# Patient Record
Sex: FEMALE | Race: WHITE
[De-identification: names, ages, dates, MRNs, and addresses within clinical notes are randomized per-mention and may not be internally consistent; named-entity substitution may affect disease eponyms.]

---

## 2017-04-12 ENCOUNTER — HOSPITAL ENCOUNTER (OUTPATIENT)
Dept: HOSPITAL 53 - M ADAMS | Age: 57
End: 2017-04-12
Attending: PHYSICIAN ASSISTANT
Payer: COMMERCIAL

## 2017-04-12 DIAGNOSIS — J20.9: Primary | ICD-10-CM

## 2017-04-12 NOTE — REP
PA LATERAL CHEST:  04/12/2017.

 

CLINICAL HISTORY:  Acute bronchitis symptoms.  Nonproductive cough.  Chest

discomfort.

 

No prior study.

 

FINDINGS:  Lungs are well inflated.  There is no pleural effusion or dense

consolidation.  There is peribronchial thickening bilaterally which might reflect

reactive airway disease or bronchitis.  Some streaky or patchy density adjacent

left heart border.  This may be epicardial fat pad, some atelectasis or even

patchy infiltrate.  No effusion or dense consolidation.  Retrocardiac zone shows

some patchy atelectasis or infiltrate on the left base.  No definite effusion or

pleural thickening or apical scarring.  Heart, mediastinal and hilar contours

normal.  Airway intact.  Calcified aortic arch. No compression deformity or

destructive lesion in the spine.  The other visible bones also intact.

 

IMPRESSION: 1. Peribronchial thickening suggesting bronchiolitis or reactive

airway disease with retrocardiac left lower lobe and left base adjacent to the

heart border with patchy atelectasis or infiltrates.  It could be an epicardial

fat pad contributing to the left heart border appearance.  No effusion.

 

2.  No cardiomegaly or edema.

 

3.  Aorta with calcified arch but no aneurysm, it is intact.

 

 

 

 

Signed by

Houston Ferguson MD 04/12/2017 03:31 P

## 2017-04-25 ENCOUNTER — HOSPITAL ENCOUNTER (OUTPATIENT)
Dept: HOSPITAL 53 - M RAD | Age: 57
End: 2017-04-25
Attending: PAIN MEDICINE
Payer: COMMERCIAL

## 2017-04-25 DIAGNOSIS — M54.2: Primary | ICD-10-CM

## 2017-04-25 NOTE — REP
MR CERVICAL SPINE WITHOUT CONTRAST:

 

HISTORY:  Cervicalgia.

 

Facet hypertrophy is present on the left at the C3-4 level. This produces mild

narrowing of the left C3 neural foramen. The right C3 neural foramen in patent.

Facet hypertrophy is present on the left at the C4-5 level. This produces minimal

narrowing of the left C4 neural foramen. The right C4 neural foramen in patent. A

disc bulge with associated osteophyte formation is present at the C5-6 level.

There is mild effacement of the thecal sac without spinal cord compression.

Bilateral uncinate process hypertrophy is present. This produces moderate

narrowing of the C5 neural foramina.  There is no other disc bulge or herniation.

The remaining neural foramina are patent.  The spinal cord is normal in signal

intensity. The C5-6 intervertebral disc is decreased in height consistent with

disc degeneration.  Normal signal intensity is present in the cervical vertebral

bodies.

 

IMPRESSION:

 

There is cervical spondylosis at the C3-4 through C5-6 levels without spinal cord

compression.

 

 

Signed by

Yusuf Yadav MD 04/25/2017 09:23 A

## 2017-06-08 ENCOUNTER — HOSPITAL ENCOUNTER (OUTPATIENT)
Dept: HOSPITAL 53 - M LABNEURO | Age: 57
End: 2017-06-08
Attending: PSYCHIATRY & NEUROLOGY
Payer: COMMERCIAL

## 2017-06-08 DIAGNOSIS — E11.21: Primary | ICD-10-CM

## 2017-06-08 LAB
EST. AVERAGE GLUCOSE BLD GHB EST-MCNC: 108 MG/DL (ref 60–110)
FOLATE SERPL-MCNC: 8.1 NG/ML (ref 5.4–?)
PROT SERPL-MCNC: 6.6 GM/DL (ref 6.4–8.2)
VIT B12 SERPL-MCNC: 380 PG/ML (ref 247–911)

## 2017-06-09 LAB
ALBUMIN MFR UR ELPH: 61.4 % (ref 55.8–66.1)
ALBUMIN SERPL-MCNC: 4.05 GM/DL (ref 3.29–5.55)
GAMMA GLOB 24H MFR UR ELPH: 10.1 % (ref 11.1–18.8)

## 2017-06-12 LAB — A-TOCOPHEROL VIT E SERPL-MCNC: 11.2 MG/L (ref 5.3–16.8)

## 2017-10-03 ENCOUNTER — HOSPITAL ENCOUNTER (OUTPATIENT)
Dept: HOSPITAL 53 - M LAB REF | Age: 57
End: 2017-10-03
Attending: OPHTHALMOLOGY
Payer: COMMERCIAL

## 2017-10-03 DIAGNOSIS — L72.0: Primary | ICD-10-CM

## 2017-10-11 ENCOUNTER — HOSPITAL ENCOUNTER (OUTPATIENT)
Dept: HOSPITAL 53 - M RAD | Age: 57
End: 2017-10-11
Attending: OTOLARYNGOLOGY
Payer: COMMERCIAL

## 2017-10-11 DIAGNOSIS — R49.0: Primary | ICD-10-CM

## 2017-10-11 PROCEDURE — 74220 X-RAY XM ESOPHAGUS 1CNTRST: CPT

## 2017-10-11 PROCEDURE — 70491 CT SOFT TISSUE NECK W/DYE: CPT

## 2017-10-11 NOTE — REP
Esophagram

 

The procedure was performed under the direct supervision of Dr. Torres.  The images

were reviewed with Dr. Torres.

 

A single view PA chest x-ray is submitted as a  film.  The superior

mediastinal structures are midline.  The heart size is within normal limits.  The

lungs are clear.

 

Liquid barium and gas producing granules were given in the erect position as well

as liquid barium in the prone oblique positions in order to perform a double

contrast esophagram examination.

 

The oral and pharyngeal stages of deglutition are unremarkable. Note is made of

bilateral carotid vascular calcifications. Esophageal transport is prompt and

efficient and there is no esophagitis, stricture, mucosal ring or hiatal hernia.

Gastroesophageal reflux is not demonstrated on this examination.

 

Impression:

Essentially unremarkable double contrast esophagram examination.  Note is made of

bilateral carotid vascular calcifications.

 

1 minute and 9 seconds of fluoro time was utilized for this procedure.

 

 

Reviewed by

SANDRA Gonsalez 10/11/2017 04:11 PSigned by

Rowdy Torres MD 10/11/2017 04:59 P

## 2017-10-11 NOTE — REP
CT NECK WITH CONTRAST:

 

HISTORY:  Dysphagia.

 

CONTRAST:  Isovue 370, 75 mL.

 

Calcifications are present in the tonsils.  This is secondary to previous

inflammatory disease.  The naso-, kely- and hypopharynx, larynx and subglottic

trachea are otherwise normal in appearance.  The salivary and thyroid glands are

normal in size and density.  Small lymph nodes less than 1 cm in size are present

in the internal jugular chains, posterior triangles and submandibular area.

Atherosclerotic calcification is present at the bifurcations. Degenerative change

is present in the cervical spine.  The lung apices are clear.  The visualized

sinuses are clear.

 

IMPRESSION:

 

There is no neck mass or adenopathy.

 

 

Signed by

Yusuf Yadav MD 10/11/2017 08:58 A

## 2018-02-16 ENCOUNTER — HOSPITAL ENCOUNTER (OUTPATIENT)
Dept: HOSPITAL 53 - M SFHCADAM | Age: 58
End: 2018-02-16
Attending: PHYSICIAN ASSISTANT
Payer: COMMERCIAL

## 2018-02-16 DIAGNOSIS — Z20.6: Primary | ICD-10-CM

## 2018-02-16 LAB — HIV 1&2 SCREEN CENTAUR: NEGATIVE

## 2018-03-13 ENCOUNTER — HOSPITAL ENCOUNTER (OUTPATIENT)
Dept: HOSPITAL 53 - M SFHCADAM | Age: 58
End: 2018-03-13
Attending: PHYSICIAN ASSISTANT
Payer: COMMERCIAL

## 2018-03-13 DIAGNOSIS — G44.211: Primary | ICD-10-CM

## 2018-03-13 DIAGNOSIS — I10: ICD-10-CM

## 2018-03-13 LAB
ALBUMIN/GLOBULIN RATIO: 1.19 (ref 1–1.93)
ALBUMIN: 3.8 GM/DL (ref 3.2–5.2)
ALKALINE PHOSPHATASE: 120 U/L (ref 45–117)
ALT SERPL W P-5'-P-CCNC: 29 U/L (ref 12–78)
ANION GAP: 8 MEQ/L (ref 8–16)
AST SERPL-CCNC: 19 U/L (ref 7–37)
BILIRUBIN,TOTAL: 0.5 MG/DL (ref 0.2–1)
BLOOD UREA NITROGEN: 10 MG/DL (ref 7–18)
CALCIUM LEVEL: 9.5 MG/DL (ref 8.5–10.1)
CARBON DIOXIDE LEVEL: 27 MEQ/L (ref 21–32)
CHLORIDE LEVEL: 106 MEQ/L (ref 98–107)
CREATININE FOR GFR: 0.56 MG/DL (ref 0.55–1.3)
ERYTHROCYTE SEDIMENTATION RATE: 5 MM/HR (ref 0–30)
GFR SERPL CREATININE-BSD FRML MDRD: > 60 ML/MIN/{1.73_M2} (ref 51–?)
GLUCOSE, FASTING: 88 MG/DL (ref 70–100)
HEMATOCRIT: 47.6 % (ref 36–47)
HEMOGLOBIN: 15.7 G/DL (ref 12–16)
MEAN CORPUSCULAR HEMOGLOBIN: 31.3 PG (ref 27–33)
MEAN CORPUSCULAR HGB CONC: 33 G/DL (ref 32–36.5)
MEAN CORPUSCULAR VOLUME: 94.8 FL (ref 80–96)
NRBC BLD AUTO-RTO: 0 % (ref 0–0)
PLATELET COUNT, AUTOMATED: 247 10^3/UL (ref 150–450)
POTASSIUM SERUM: 4.4 MEQ/L (ref 3.5–5.1)
RED BLOOD COUNT: 5.02 10^6/UL (ref 4–5.4)
RED CELL DISTRIBUTION WIDTH: 12.8 % (ref 11.5–14.5)
SODIUM LEVEL: 141 MEQ/L (ref 136–145)
TOTAL PROTEIN: 7 GM/DL (ref 6.4–8.2)
WHITE BLOOD COUNT: 9.7 10^3/UL (ref 4–10)

## 2018-03-13 PROCEDURE — 80053 COMPREHEN METABOLIC PANEL: CPT

## 2018-06-11 ENCOUNTER — HOSPITAL ENCOUNTER (OUTPATIENT)
Dept: HOSPITAL 53 - M PLARAD | Age: 58
End: 2018-06-11
Payer: COMMERCIAL

## 2018-06-11 DIAGNOSIS — M54.2: Primary | ICD-10-CM

## 2018-06-11 PROCEDURE — 72141 MRI NECK SPINE W/O DYE: CPT

## 2018-08-07 ENCOUNTER — HOSPITAL ENCOUNTER (EMERGENCY)
Dept: HOSPITAL 53 - M ED | Age: 58
Discharge: HOME | End: 2018-08-07
Payer: COMMERCIAL

## 2018-08-07 DIAGNOSIS — I45.10: ICD-10-CM

## 2018-08-07 DIAGNOSIS — Z88.2: ICD-10-CM

## 2018-08-07 DIAGNOSIS — F17.210: ICD-10-CM

## 2018-08-07 DIAGNOSIS — Z88.0: ICD-10-CM

## 2018-08-07 DIAGNOSIS — R07.89: Primary | ICD-10-CM

## 2018-08-07 DIAGNOSIS — I10: ICD-10-CM

## 2018-08-07 DIAGNOSIS — Z79.899: ICD-10-CM

## 2018-08-07 DIAGNOSIS — Z82.49: ICD-10-CM

## 2018-08-07 DIAGNOSIS — M54.6: ICD-10-CM

## 2018-08-07 LAB
ANION GAP: 6 MEQ/L (ref 8–16)
BASO #: 0.1 10^3/UL (ref 0–0.2)
BASO %: 0.8 % (ref 0–1)
BLOOD UREA NITROGEN: 9 MG/DL (ref 7–18)
CALCIUM LEVEL: 8.6 MG/DL (ref 8.5–10.1)
CARBON DIOXIDE LEVEL: 28 MEQ/L (ref 21–32)
CHLORIDE LEVEL: 109 MEQ/L (ref 98–107)
CK MB CFR.DF SERPL CALC: 2.02
CK MB CFR.DF SERPL CALC: 2.72
CK SERPL-CCNC: 77 U/L (ref 26–192)
CK SERPL-CCNC: 94 U/L (ref 26–192)
CK-MB VALUE MASS: 1.9 NG/ML (ref ?–3.6)
CK-MB VALUE MASS: 2.1 NG/ML (ref ?–3.6)
CREATININE FOR GFR: 0.59 MG/DL (ref 0.55–1.3)
D-DIMER QUANT: 530.9 NG/ML (ref ?–500)
EOS #: 0.2 10^3/UL (ref 0–0.5)
EOSINOPHIL NFR BLD AUTO: 2 % (ref 0–3)
GFR SERPL CREATININE-BSD FRML MDRD: > 60 ML/MIN/{1.73_M2} (ref 51–?)
GLUCOSE, FASTING: 91 MG/DL (ref 70–100)
HEMATOCRIT: 46 % (ref 36–47)
HEMOGLOBIN: 15.3 G/DL (ref 12–15.5)
IMMATURE GRANULOCYTE %: 0.3 % (ref 0–3)
LYMPH #: 2.3 10^3/UL (ref 1.5–4.5)
LYMPH %: 29.8 % (ref 24–44)
MEAN CORPUSCULAR HEMOGLOBIN: 32.6 PG (ref 27–33)
MEAN CORPUSCULAR HGB CONC: 33.3 G/DL (ref 32–36.5)
MEAN CORPUSCULAR VOLUME: 97.9 FL (ref 80–96)
MONO #: 0.5 10^3/UL (ref 0–0.8)
MONO %: 6.5 % (ref 0–5)
NEUTROPHILS #: 4.6 10^3/UL (ref 1.8–7.7)
NEUTROPHILS %: 60.6 % (ref 36–66)
NRBC BLD AUTO-RTO: 0 % (ref 0–0)
PLATELET COUNT, AUTOMATED: 162 10^3/UL (ref 150–450)
POTASSIUM SERUM: 3.7 MEQ/L (ref 3.5–5.1)
RED BLOOD COUNT: 4.7 10^6/UL (ref 4–5.4)
RED CELL DISTRIBUTION WIDTH: 12.6 % (ref 11.5–14.5)
SODIUM LEVEL: 143 MEQ/L (ref 136–145)
TROPONIN I: < 0.02 NG/ML (ref ?–0.1)
TROPONIN I: < 0.02 NG/ML (ref ?–0.1)
WHITE BLOOD COUNT: 7.6 10^3/UL (ref 4–10)

## 2018-08-07 RX ADMIN — ONDANSETRON 1 MG: 2 INJECTION INTRAMUSCULAR; INTRAVENOUS at 08:45

## 2018-08-07 RX ADMIN — MORPHINE SULFATE 1 MG: 4 INJECTION INTRAVENOUS at 08:45

## 2018-08-07 RX ADMIN — ONDANSETRON 1 MG: 2 INJECTION INTRAMUSCULAR; INTRAVENOUS at 08:49

## 2018-08-07 RX ADMIN — KETOROLAC TROMETHAMINE 1 MG: 30 INJECTION, SOLUTION INTRAMUSCULAR at 09:11

## 2018-08-07 RX ADMIN — MORPHINE SULFATE 1 MG: 4 INJECTION INTRAVENOUS at 08:49

## 2019-02-15 ENCOUNTER — HOSPITAL ENCOUNTER (OUTPATIENT)
Dept: HOSPITAL 53 - M PAIN | Age: 59
End: 2019-02-15
Attending: ANESTHESIOLOGY
Payer: COMMERCIAL

## 2019-02-15 DIAGNOSIS — M47.816: ICD-10-CM

## 2019-02-15 DIAGNOSIS — M48.061: ICD-10-CM

## 2019-02-15 DIAGNOSIS — Z88.1: ICD-10-CM

## 2019-02-15 DIAGNOSIS — I10: ICD-10-CM

## 2019-02-15 DIAGNOSIS — M47.812: Primary | ICD-10-CM

## 2019-02-15 DIAGNOSIS — R49.0: ICD-10-CM

## 2019-02-15 DIAGNOSIS — G62.9: ICD-10-CM

## 2019-02-15 DIAGNOSIS — I73.00: ICD-10-CM

## 2019-02-15 DIAGNOSIS — Z79.891: ICD-10-CM

## 2019-02-15 DIAGNOSIS — Z79.899: ICD-10-CM

## 2019-02-15 DIAGNOSIS — Z90.49: ICD-10-CM

## 2019-02-15 DIAGNOSIS — Z88.2: ICD-10-CM

## 2019-02-15 DIAGNOSIS — M51.16: ICD-10-CM

## 2019-02-15 DIAGNOSIS — F17.210: ICD-10-CM

## 2019-02-15 DIAGNOSIS — Z88.0: ICD-10-CM

## 2019-02-21 ENCOUNTER — HOSPITAL ENCOUNTER (OUTPATIENT)
Dept: HOSPITAL 53 - M RAD | Age: 59
End: 2019-02-21
Attending: PHYSICIAN ASSISTANT
Payer: COMMERCIAL

## 2019-02-21 DIAGNOSIS — R42: Primary | ICD-10-CM

## 2019-02-21 DIAGNOSIS — R55: ICD-10-CM

## 2019-02-21 PROCEDURE — 93880 EXTRACRANIAL BILAT STUDY: CPT

## 2019-02-21 PROCEDURE — 70553 MRI BRAIN STEM W/O & W/DYE: CPT

## 2019-02-21 NOTE — REP
MRI BRAIN WITHOUT CONTRAST: HISTORY:  Dizziness.

 

CONTRAST:  ProHance 15 mL.

 

Several punctate areas of increased signal intensity on T2-weighted images are

present in the isela.  This represents small vessel ischemic disease.  There is no

intraparenchymal hemorrhage, infarct, mass or midline shift.  There is no

abnormal enhancement.  The ventricular system is normal in appearance.  There is

no extracerebral collection.  There is inferior extension of the cerebellar

tonsils    through     the foramen magnum consistent with cerebellar tonsillar

ectopia.  There is no cerebellopontine angle mass.  The inner ear structures are

normal in appearance.  Minimal mucosal thickening is present in the mastoid air

cells.  The sinuses are clear.

 

IMPRESSION:

 

1.  Minimal small vessel ischemic disease.

 

2.  Cerebellar tonsillar ectopia.

 

 

Electronically Signed by

Yusuf Yadav MD 02/21/2019 11:13 A

## 2019-02-22 NOTE — REP
Clinical:  Headaches and dizziness with presyncopal episode.

 

Technique:  Gray scale and color Doppler evaluation using linear high frequency

transducer

 

Findings:

Two-dimensional gray scale and color images demonstrate mixed atheromatous

plaquing (left greater than right).  Color Doppler interrogation demonstrates

normal arterial wave patterns and velocities with no significant spectral

broadening.  Normal flow direction is appreciated in the bilateral vertebral

arteries.

 

                                              RIGHT (cm/s)         LEFT (cm/s)

 

ICA peak systolic velocity            65.0                          84.4

ICA diastolic velocity                  31.7                          25.2

ECA peak systolic velocity           88.7                          103.0

CCA peak systolic velocity           65.2                          72.4

ICA/CCA ratio                           1.0                          1.2

 

Impression:

No hemodynamically significant areas of narrowing or stenosis appreciated.

Based on set standards narrowing falls within the less than 50% range.

 

 

Electronically Signed by

Marcos Stroud MD 02/22/2019 09:56 A

## 2019-03-04 NOTE — ECWPNPC
PATIENT NAME: PRECIOUS CONNORS

: 1960

GENDER: FEMALE

MRN: [id number]

VISIT DATE: 02/15/2019

DISCHARGE DATE: 02/15/19 1224

VISIT LOCKED DATE TIME: 

PHYSICIAN: DANIELA HUGGINS MD

RESOURCE: DANIELA HUGGINS MD

 

           

           

REASON FOR APPOINTMENT

           

          1. NECK AND BACK PAIN W/ POLYNEUROPATHY

           

HISTORY OF PRESENT ILLNESS

           

      NEW PATIENT CONSULT:

      WHEN DID YOUR PAIN FIRST START?

          _____.

           

      BRIEFLY DESCRIBE HOW YOUR PAIN STARTED?

          ______.

           

      HOW DOES YOUR PAIN CHANGE WITH TIME?

          _______.

           

      DOES YOUR PAIN AWAKEN YOU FROM SLEEP?

          _____.

           

      HOW MANY HOURS OF SLEEP DO YOU NORMALLY GET?

          ______.

           

      ANY DIAGNOSTIC TESTING?

          _____.

           

      FACILITY WHERE TESTS WERE DONE?

          ____.

           

      PAIN TREATMENT

           

           

          TREATMENT YES

           

      CANCER

           

           

          HAVE YOU EVER HAD ANY TYPE OF CANCER?NO

           

           

          NO.

           

      PAIN SCREENING:

      PATIENT HAS A COMPLAINT OF ACUTE OR CHRONIC PAIN

           

           

          :YES

           

           58 YEAR OLD FEMALE PATIENT WITH A HISTORY OF CHRONIC LOW BACK

          AND NECK PAIN. THE PATIENT DESCRIBES THE PAIN AS ACHING, BURNING,

          SHARP, AND CONTINUOUS WITH A PAIN SCORE OF 4-8/10 DEPENDING ON

          PHYSICAL ACTIVITY. THE PATIENT SAYS HER PAIN IS IN HER NECK AREA

          AND LOW BACK AREA WITH RADIATION DOWN HER RIGHT LEG. THE PATIENT

          SAYS THAT SHE HAS HAD THIS PAIN FOR A FEW YEARS AND HAS RECEIVED

          INJECTIONS IN THE PAST AT [facility] AND [facility]. THE

          PATIENT SAYS SHE HAS HAD GOOD PAIN RELIEF WITH INJECTIONS IN THE

          PAST. THE PATIENT REPORTS THAT SHE HAS A HISTORY OF VERTIGO AND

          SHE IS UNABLE TO MOVE HER NECK CERTAIN WAYS DUE TO THIS, BUT IS

          BEING TREATED FOR IT. PATIENT DENIES UNEXPLAINABLE WEIGHT LOSS,

          FEVER, CHILLS, NEW CHANGES ON HER URINARY OR BOWEL CONTROL.

           

      FALL RISK SCREENING:

      SCREENING

           

          : NO FALLS IN THE PAST YEAR.

           

      BECK INVENTORY:

      QUESTIONNAIRE

           

           

          ASSESSEDTBD

           

      SCORE

           

           

          VALUE CALCULATED TBD

           

CURRENT MEDICATIONS

           

          TAKING OXYCODONE-ACETAMINOPHEN  MG TABLET 1 TABLET AS

          NEEDED ORALLY

          TAKING GABAPENTIN 600 MG TABLET 1 CAP AM 1/2 NOON 1 CAP PM ORALLY

          THREE TIMES DAILY

          TAKING MULTIVITAMIN - TABLET CHEWABLE 1 TAB ORALLY DAILY

          TAKING VITAMIN B12 1000 MCG TABLET EXTENDED RELEASE 1 TABLET

          ORALLY ONCE A DAY

          TAKING AMLODIPINE BESYLATE 10 MG TABLET 1 TABLET ORALLY ONCE A

          DAY IN THE WINTER DUE TO FROST BITE/FEET

          TAKING MECLIZINE HCL 25 MG TABLET 1 TABLET AS NEEDED ORALLY

          BEFORE BEDTIME

          TAKING CLARITIN 10 MG TABLET 1 TABLET ORALLY ONCE A DAY

          TAKING CHANTIX STARTING MONTH JERSEY 0.5 MG X 11 & 1 MG X 42 TABLET

          AS DIRECTED ORALLY AS DIRECTED, NOTES: NOT STARTED YET

          TAKING CHANTIX CONTINUING MONTH JERSEY 1 MG TABLET 1 TABLET ORALLY

          TWICE A DAY, NOTES: NOT STARTED YET

          NOT-TAKING FLUTICASONE PROPIONATE 50 MCG/ACT SUSPENSION SPRAY 2

          SPRAYS IN EACH NOSTRIL ONCE DAILY NASAL

          NOT-TAKING OXYCONTIN 10 MG TABLET EXTENDED RELEASE 12 HOUR 1

          TABLET ORALLY DAILY PRN

          MEDICATION LIST REVIEWED AND RECONCILED WITH THE PATIENT

           

PAST MEDICAL HISTORY

           

          ABNORMAL PAP SMEAR, HPV +PRIOR TO 

          RAYNAUD'S

          FROST BITE- FEET 

          NEUROPATHY ARMS AND LEGS - EMG 16 - SENSORY PERIPHERAL

          POLYNEUROPATHY - FOLLOWED BY DR. LOPEZ

          CHRONIC LOW BACK PAIN 2017 - MRI C-SPINE C3-4 - C5-6

          SPONDYLOSIS WITHOUT SPINAL CORD COMPRESSION

          MRI LUMBARDIFFUSE DISC BULGS, MILD CENTRAL CANAL STENOSIS - GETS

          NERVE BLOCKS AT SPINE AND WELLNESS

          BL CARPEL TUNNEL

          HTN

          NICOTINE DEPENDENCE

          CHRONIC HOARSE VOICE -UNDERWENT SCOPE BY ENT - TREATED BY ENT

          WITH PPI X 1 MONTH.

           

ALLERGIES

           

          PENICILLIN (FOR ALLERGIES USE ONLY): ANAPHYLAXIS: ALLERGY

          SULFA (FOR ALLERGY USE ONLY): ANAPHYLAXIS: ALLERGY

          DOXYCYCLINE: NAUSEA, DIARRHEA: SIDE EFFECTS

           

SURGICAL HISTORY

           

          CHOLECYSTECTOMY 

          COLPOSCOPY [id number]

          COLONOSCOPY (FOR DIARRHEA) WITH NORMAL FINDINGS 

          NUMEROUS INJECTIONS INCLUDING RADIOFREQUENCY 2016 UNTIL NOW

           

FAMILY HISTORY

           

          FATHER:  71 YRS, MVA

          MOTHER: ALIVE, ATRIAL FIB

          SIBLINGS: SISTER BREAST CANCER AT AGE EARLY 40'S

          [facility]

           

SOCIAL HISTORY

           

          GENERAL:

           

          TOBACCO USE

          ARE YOU A:CURRENT SMOKER

          ARE YOU INTERESTED IN QUITTING?THINKING ABOUT QUITTING

          COUNSELED THE PATIENT ON SMOKING CESSATION, EDUCATION

          [id number]/15/2019

          HOW MANY CIGARETTES A DAY DO YOU SMOKE?11-20

          HOW SOON AFTER YOU WAKE UP DO YOU SMOKE YOUR FIRST

          CIGARETTE?WITHIN 5 MIN

          HOW OFTEN DO YOU SMOKE CIGARETTES?EVERY DAY

          PATIENT COUNSELED ON THE DANGERS OF TOBACCO USE AND URGED TO

          QUIT:02/15/2019

           

           

          ALCOHOL SCREENING

          DID YOU HAVE A DRINK CONTAINING ALCOHOL IN THE PAST YEAR?NO

          POINTS0

          INTERPRETATIONNEGATIVE

           

           

          RECREATIONAL DRUG USE  SMOKING STATUS: CURRENT SMOKER, IS THE

          PATIENT BETWEEN THE AGE OF 57 AND 79? YES, HAS THE PATIENT EVER

          BEEN DIAGNOSED WITH LUNG CANCER? NO.

           

           

          CAFFEINE

          CAFFEINE USE?YES DAILY SEVERAL CUPS

           

           

          Mu-ism

          [id number]

           

           

          LANGUAGE

          LANGUAGES SPOKEN:ENGLISH

           

           

          EDUCATION

          LEVEL OF EDUCATION:HIGH SCHOOL

           

           

          OCCUPATION: DO YOU FEEL SAFE IN YOUR ENVIRONMENT? YES.

           

           

          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

          [facility] [employer].

           

           

          EXERCISE: DO YOU FEEL SAFE IN YOUR ENVIRONMENT? YES [employer]

          [facility], REGULAR.

           

           

          MARITAL STATUS: DO YOU FEEL SAFE IN YOUR ENVIRONMENT? YES [employer]

          [employer], REGULAR, WALKS KEEP WORKING.

           

           

          OTHERS AT HOME: DO YOU FEEL SAFE IN YOUR ENVIRONMENT? YES [employer]

          [employer] [employer], REGULAR, WALKS KEEP WORKING, SINGLE.

           

           

          HOUSING: PFS REFERRAL NEEDED? NO, CLERGY REFERRAL NEEDED? NO,

          PUBLIC HEALTH REFERRAL NEEDED? NO, WAS THE PROVIDER NOTIFIED OF

          ANY PERTINENT INFO? NO, HAS THE PATIENT BEEN EDUCATED REGARDING

          HIS/HER PLAN OF CARE? YES, HAS THE PATIENT BEEN EDUCATED

          REGARDING PAIN, THE RISK FOR PAIN, THE IMPORTANCE OF EFFECTIVE

          PAIN MANAGEMENT, AND THE PAIN ASSESSMENT PROCESS? YES.

           

           

          ADVANCE DIRECTIVE

          ADVANCE DIRECTIVE DISCUSSED WITH PATIENT:YES PATIENT GIVEN

          PRINTED MATERIAL TO TAKE HOME TO FILL OUT WITH FAMILY MEMBERS,

          DECLINES MY ASSISTANCE WITH IT AT THIS TIME

           

HOSPITALIZATION/MAJOR DIAGNOSTIC PROCEDURE

           

          BROKEN ARM-CHILD

          SURGERY

           

REVIEW OF SYSTEMS

           

      REVIEWED BY:

           

          PROVIDER:    DANIELA HUGGINS MD .

           

      CONSTITUTIONAL:

           

          ANY CHANGE IN YOUR MEDICAL CONDITION?    NO . CHILLS    NO .

          FEVER    NO .

           

      INFECTION:

           

          DO YOU HAVE NEW INFECTIONS?    NO . DO YOU HAVE HISTORY OF MRSA? 

            NO .

           

      MUSCULOSKELETAL:

           

          ANY NEW PATTERNS OF PAIN OR NUMBNESS?    INCREASED "EVERYWHERE" .

          SYTEMIC LUPUS    NO .

           

      GASTROENTEROLOGY:

           

          ANY NEW CHANGE IN BOWEL CONTROL?    NO . BARRETTS ESOPHAGUS    NO

          . CIRRHOSIS    NO . HEPATITIS    NO . LIVER FAILURE    NO . ACID

          REFLUX    NO . UNEXPLAINED WEIGHT LOSS    NO .

           

      GENITOURINARY:

           

          ANY NEW CHANGE IN BLADDER CONTROL?    NO . IS THERE A CHANCE YOU

          COULD BE PREGNANT?    NO .

           

      HEMATOLOGY/LYMPH:

           

          DO YOU TAKE ANY BLOOD THINNERS? (FOR EXAMPLE- COUMADIN, PLAVIX,

          AGGRENOX, PLATEL, PRADAXA, OR XARELTO)    NO . WHEN WAS YOUR LAST

          DOSE?    DATE: TIME:  . LOW PLATELET COUNT    NO . SICKLE CELL

          DISEASE    NO . VON WILLIEBRANDS    NO . FACTOR V LEIDEN    NO .

          THALLASEMIA    NO . ANEMIA    NO . EASY BRUISING    NO .

           

      NEUROLOGY:

           

          HAVE YOU FALLEN IN THE PAST 12 MONTHS?    NO . ANY NEW EXTREMITY

          NUMBNESS OR WEAKNESS?    NO . HEAD INJURY    NO . DEMENTIA    NO

          . CEREBRAL PALSY    NO . MULTIPLE SCLEROSIS    NO . DIZZINESS   

          YES . HEADACHE    NO . STROKES    NO . VERTIGO    YES FLUID

          BEHIND THE EAR RECENTLY IS TAKING MEDICATION TO RESOLVE THIS

          ISSUE  .

           

      CARDIOLOGY:

           

          DO YOU HAVE A PACEMAKER OR DEFIBRILLATOR?    NO . ANGINA    NO .

          HEART ATTACK    NO . HEART SURGERY    NO . CONGESTIVE HEART

          FAILURE/FLUID OVERLOAD    NO . CHEST PAIN    NO . HIGH BLOOD

          PRESSURE    NO . IRREGULAR HEART BEAT    NO .

           

      RESPIRATORY:

           

          HAVE YOU BEEN SICK IN THE PAST WEEK?    NO . FEVER    NO . FLU

          LIKE SYMPTOMS?    NO . CPAP    NO . BYPAP    NO . ASTHMA    NO .

          EMPHYSEMA    NO . CHRONIC LUNG DISEASES    NO . SHORTNESS OF

          BREATH ON EXERTION    NO . DO YOU USE ANY TYPE OF TOBACCO (SMOKE,

          SMOKELESS, CHEW)?    YES WILL BE STARTING CHANTIX XOON . COUGH   

          NO . SNORING    NO .

           

      INTEGUMENTARY:

           

          DO YOU HAVE ANY RASHES OR OPEN SORES?    NO .

           

      ALLERGIC/IMMUNO:

           

          ARE YOU ALLERGIC TO IV DYE?    NO . ANY NEW ALLERGIES?    NO .

           

      PSYCHIATRIC:

           

          DO YOU HAVE THOUGHTS OF HURTING YOURSELF OR SOMEONE ELSE?    NO .

          ARE YOU ABUSED, NEGLECTED, OR IN AN UNSAFE ENVIRONMENT?    NO .

           

      ENDOCRINOLOGY:

           

          ARE YOU DIABETIC?    NO . THYROID DISORDER    NO .

           

      OTHER:

           

          DO YOU NEED ANY PRESCRIPTIONS?    NO . IF YES, PLEASE LIST:   

          ____ . ANY NEW PROBLEMS WITH YOUR MEDICATIONS?    NO . WHEN DID

          YOU LAST EAT?    ____ . WHEN DID YOU LAST DRINK?    ____ . WHAT

          DID YOU LAST DRINK?    ____ . NAME OF PERSON DRIVING YOU HOME?   

          ____ . DO YOU HAVE ANY OTHER QUESTIONS OR CONCERNS    NO .

           

VITAL SIGNS

           

           LBS, HT 5'3 1/2", BMI 29.29 INDEX, /75 MM HG, HR 89

          /MIN, RR 18 /MIN, TEMP 97.6 F, OXYGEN SAT % 95%, SAFE IN ENV?

          (Y/N) YES, NA INITIALS SC 10:28, REVIEWED BY: KG.

           

EXAMINATION

           

      GENERAL EXAMINATION: PATIENT IS ALERT O X 3 AND

          COOPERATIVE. LUNGS CLEAR, TO AUSCULTATION. HEART: NO MURMURS OR

          GALLOPS; FACIAL CRANIAL NERVES ARE GROSSLY NORMAL. GOOD SYMMETRY

          OF FACIAL MUSCLE MOVEMENT. NORMAL VISUAL FIELDS. PATIENT HAS

          DIFFICULTY ABDUCTING THE UPPER EXTREMITIES. PAIN INCREASES OVER

          THE CERVICAL FACET JOINTS WITH EXTENSION AND LATERAL ROTATION OF

          THE NECK. PATIENT IS IN A FLEXED POSITION. TENDERNESS IN THE LOW

          BACK AREA. RIGHT LEG IS WEAKER AT EXTENSION AND FLEXION. STRAIGHT

          LEG RAISE OF THE RIGHT LEG IS POSITIVE AT 45 DEGREES FOR

          RADICULOPATHY. MRI OF THE CERVICAL SPINE DONE ON 2018 SHOWS

          BULGING DISCS AND FACET ARTHROPATHY CHANGES AT MULTIPLE LEVELS.

          MRI OF THE LUMBAR SPINE DONE ON 10/07/2016 SHOWS FACET

          ARTHROPATHY CHANGES, BULGING DISCS, AND STENOSIS AT MULTIPLE

          LEVELS.

           

ASSESSMENTS

           

          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

          RADICULOPATHY - M47.812 (PRIMARY)

           

          SPONDYLOSIS OF LUMBAR REGION WITHOUT MYELOPATHY OR RADICULOPATHY

          - M47.816

           

          SPINAL STENOSIS OF LUMBAR REGION, UNSPECIFIED WHETHER NEUROGENIC

          CLAUDICATION PRESENT - M48.061

           

          INTERVERTEBRAL DISC DISORDER WITH RADICULOPATHY OF LUMBAR REGION

          - M51.16

           

TREATMENT

           

      SPONDYLOSIS OF CERVICAL REGION WITHOUT MYELOPATHY

          OR RADICULOPATHY

          CLINICAL NOTES: WE DISCUSSED SEVERAL ISSUES WITH MRS. CONNORS'S

          PAIN MANAGEMENT CASE. I WOULD LIKE TO GET COPIES OF THE PATIENT'S

          MOST RECENT LUMBAR MRI REPORT. THE PATIENT WILL CONTINUE WITH HER

          LOW BACK INJECTIONS IN [place] FOR NOW SINCE THEY HAVE ALREADY

          REQUESTED AUTHORIZATION. THE PATIENT WILL CONSIDER INJECTIONS

          OVER THE NECK ONCE THE VERTIGO IS UNDER CONTROL. THE PATIENT WILL

          NEED IV SEDATION WITH ANY PROCEDURES DUE TO PAIN AND ANXIETY

          ASSOCIATED WITH THE PROCEDURES. THE PATIENT WILL FOLLOW UP IN 2

          MONTHS. INSTRUCTIONS WERE GIVEN, QUESTIONS WERE ANSWERED, PATIENT

          REPORTS UNDERSTANDING AND AGREES WITH THE PLAN. I, JOHN LARA, DOCUMENTED THE ABOVE INFORMATION ACTING AS A SCRIBE FOR

          DR. HUGGINS. I HAVE REVIEWED THE ABOVE DOCUMENT, WRITTEN BY

          JOHN CURRY AND I VERIFY THAT IT IS ACCURATE. DEAR DR. GRULLON:THANK YOU FOR YOUR KIND REFERRAL OF MRS. CONNORS. IF

          YOU WANT TO DISCUSS HER CASE WITH ME PLEASE CALL ME AT THE PAIN

          CENTER -[id number]. SINCERELY,DANIELA HUGGINS, [facility].

           

PROCEDURE CODES

           

           ESTABILISHED PATIENT [facility] FACILITY CHARGE

           

           CURRENT MEDS W/DOSAGES DOCUMENTED

           

           PAIN ASSESS POS TOOL F/U PLAN DOC

           

DISPOSITION & COMMUNICATION

           

FOLLOW UP

           

          2 MONTHS (REASON: NECK & LOW BACK)

           

 

ELECTRONICALLY SIGNED BY DANIELA HUGGINS MD, MD ON

          2019 AT 01:45 PM EST

           

           

           

 

DISCLAIMER :

THIS IS A VISIT SUMMARY EXTRACTED FROM THE [facility] CHART.

IT IS NOT A COPY OF THE [facility] PROGRESS NOTE.

MTDD

## 2020-01-28 ENCOUNTER — HOSPITAL ENCOUNTER (OUTPATIENT)
Dept: HOSPITAL 53 - M ADAMS | Age: 60
End: 2020-01-28
Attending: NURSE PRACTITIONER
Payer: COMMERCIAL

## 2020-01-28 ENCOUNTER — HOSPITAL ENCOUNTER (OUTPATIENT)
Dept: HOSPITAL 53 - M ADAMS | Age: 60
End: 2020-01-28
Attending: PHYSICIAN ASSISTANT
Payer: COMMERCIAL

## 2020-01-28 ENCOUNTER — HOSPITAL ENCOUNTER (OUTPATIENT)
Dept: HOSPITAL 53 - M SFHCADAM | Age: 60
End: 2020-01-28
Attending: PHYSICIAN ASSISTANT
Payer: COMMERCIAL

## 2020-01-28 DIAGNOSIS — R53.82: ICD-10-CM

## 2020-01-28 DIAGNOSIS — M19.012: ICD-10-CM

## 2020-01-28 DIAGNOSIS — M54.5: ICD-10-CM

## 2020-01-28 DIAGNOSIS — J44.9: ICD-10-CM

## 2020-01-28 DIAGNOSIS — F17.210: ICD-10-CM

## 2020-01-28 DIAGNOSIS — R06.09: Primary | ICD-10-CM

## 2020-01-28 DIAGNOSIS — Z13.220: ICD-10-CM

## 2020-01-28 DIAGNOSIS — I10: ICD-10-CM

## 2020-01-28 DIAGNOSIS — M19.011: Primary | ICD-10-CM

## 2020-01-28 LAB
25(OH)D3 SERPL-MCNC: 23.2 NG/ML (ref 30–100)
ALBUMIN SERPL BCG-MCNC: 3.8 GM/DL (ref 3.2–5.2)
ALT SERPL W P-5'-P-CCNC: 125 U/L (ref 12–78)
BILIRUB SERPL-MCNC: 0.5 MG/DL (ref 0.2–1)
BUN SERPL-MCNC: 9 MG/DL (ref 7–18)
CALCIUM SERPL-MCNC: 9.1 MG/DL (ref 8.5–10.1)
CHLORIDE SERPL-SCNC: 105 MEQ/L (ref 98–107)
CHOLEST SERPL-MCNC: 196 MG/DL (ref ?–200)
CHOLEST/HDLC SERPL: 3.38 {RATIO} (ref ?–5)
CO2 SERPL-SCNC: 29 MEQ/L (ref 21–32)
CREAT SERPL-MCNC: 0.54 MG/DL (ref 0.55–1.3)
FOLATE SERPL-MCNC: 19.4 NG/ML
GFR SERPL CREATININE-BSD FRML MDRD: > 60 ML/MIN/{1.73_M2} (ref 51–?)
GLUCOSE SERPL-MCNC: 84 MG/DL (ref 70–100)
HCT VFR BLD AUTO: 46.5 % (ref 36–47)
HDLC SERPL-MCNC: 58 MG/DL (ref 40–?)
HGB BLD-MCNC: 15.5 G/DL (ref 12–15.5)
LDLC SERPL CALC-MCNC: 114 MG/DL (ref ?–100)
MCH RBC QN AUTO: 32.6 PG (ref 27–33)
MCHC RBC AUTO-ENTMCNC: 33.3 G/DL (ref 32–36.5)
MCV RBC AUTO: 97.9 FL (ref 80–96)
NONHDLC SERPL-MCNC: 138 MG/DL
PLATELET # BLD AUTO: 210 10^3/UL (ref 150–450)
POTASSIUM SERPL-SCNC: 4.2 MEQ/L (ref 3.5–5.1)
PROT SERPL-MCNC: 6.8 GM/DL (ref 6.4–8.2)
RBC # BLD AUTO: 4.75 10^6/UL (ref 4–5.4)
SODIUM SERPL-SCNC: 138 MEQ/L (ref 136–145)
T4 FREE SERPL-MCNC: 1.29 NG/DL (ref 0.76–1.46)
TRIGL SERPL-MCNC: 121 MG/DL (ref ?–150)
TSH SERPL DL<=0.005 MIU/L-ACNC: 0.9 UIU/ML (ref 0.36–3.74)
VIT B12 SERPL-MCNC: 373 PG/ML
WBC # BLD AUTO: 8.2 10^3/UL (ref 4–10)

## 2020-01-29 NOTE — REP
Clinical:  Shoulder pain.

 

Technique:  Internal rotation, external rotation, and Y view of the right and

left shoulder.

 

Findings:

Right shoulder demonstrates cortical irregularity with subtle spurring and small

calcification at the acromioclavicular joint as well as subtle blunting to the

calcified glenoid rim.  Subacromial space is normal.  No acute fracture or

dislocation.

 

Left shoulder demonstrates minimal cortical irregularity at the acromioclavicular

joint and mild blunting to the calcified glenoid rim.  The subacromial space is

normal.  No periarticular calcifications or loose bodies noted.  No acute

fracture dislocation.

 

Impression:

Mild arthritic changes (right greater than left).

 

 

Electronically Signed by

Marcos Stroud MD 01/29/2020 03:38 A

## 2020-01-29 NOTE — REP
Clinical:  Dyspnea on exertion .

 

Comparison: 08/07/2018 .

 

Technique:  PA and lateral.

 

Findings:

The mediastinum and cardiac silhouette are normal.  The lung fields are clear and

without acute consolidation, effusion, or pneumothorax.  The skeletal structures

are intact and normal.

 

Impression:

1.   No acute cardiopulmonary process.

 

 

Electronically Signed by

Marcos Stroud MD 01/29/2020 03:34 A

## 2020-02-10 ENCOUNTER — HOSPITAL ENCOUNTER (OUTPATIENT)
Dept: HOSPITAL 53 - M RAD | Age: 60
End: 2020-02-10
Attending: PHYSICIAN ASSISTANT
Payer: COMMERCIAL

## 2020-02-10 DIAGNOSIS — R06.09: Primary | ICD-10-CM

## 2020-02-10 DIAGNOSIS — F17.210: ICD-10-CM

## 2020-02-10 NOTE — REP
Clinical:  Lung screening.  History smoking and dyspnea on exertion.

 

Comparison:  Contrast enhanced chest CT dated 08/07/2018

 

Technique:  Axial low-dose noncontrast images from the thoracic inlet to the

upper abdomen using lung screening technique.

 

Findings:

The lung fields are well-aerated. No consolidation, significant nodule or mass

lesion is appreciated.  No pleural effusion/reaction or pneumothorax.

Tracheobronchial tree is patent.  Mediastinum demonstrates mild atherosclerotic

changes of the coronary arteries without cardiomegaly.

 

Impression:

Lung-RADS category I.  No nodule or suspicious abnormality.

 

 

Electronically Signed by

Marcos Stroud MD 02/10/2020 10:28 A

## 2020-02-13 ENCOUNTER — HOSPITAL ENCOUNTER (OUTPATIENT)
Dept: HOSPITAL 53 - M SFHCADAM | Age: 60
End: 2020-02-13
Attending: PHYSICIAN ASSISTANT
Payer: COMMERCIAL

## 2020-02-13 DIAGNOSIS — R74.8: Primary | ICD-10-CM

## 2020-02-13 LAB — FERRITIN SERPL-MCNC: 90 NG/ML (ref 8–252)

## 2020-02-14 LAB
HBV CORE IGM SER QL: NEGATIVE
HBV SURFACE AB SER-ACNC: NEGATIVE M[IU]/ML
HCV AB SER QL: < 0 INDEX (ref ?–0.8)
HEPATITIS A ANTIBODY IGM: NEGATIVE

## 2020-02-16 LAB — MITOCHONDRIA M2 IGG SER-ACNC: <20 UNITS (ref 0–20)

## 2020-02-21 ENCOUNTER — HOSPITAL ENCOUNTER (OUTPATIENT)
Dept: HOSPITAL 53 - M RAD | Age: 60
End: 2020-02-21
Attending: PHYSICIAN ASSISTANT
Payer: COMMERCIAL

## 2020-02-21 DIAGNOSIS — Z90.49: ICD-10-CM

## 2020-02-21 DIAGNOSIS — R74.8: Primary | ICD-10-CM

## 2020-02-21 NOTE — REP
Right upper quadrant sonography:

 

History:  Elevated liver enzymes.  Prior cholecystectomy.  No comparison

sonography.

 

Findings:  Scanning through the right upper quadrant of the abdomen demonstrates

mildly increased echogenicity in the liver.  The liver is not enlarged and no

focal liver lesion is seen.  Limited views of the pancreas show no abnormality.

The common bile duct is normal post cholecystectomy measuring 10 mm in greatest

diameter.  No intrahepatic ductal dilation is seen.  There is no evidence of

ascites or right renal abnormality.  The right kidney measures 11.6 x 5.7 x 4.1

cm.

 

Impression:

 

No significant abnormality.  Post cholecystectomy.

 

 

Electronically Signed by

Rowdy Torres MD 02/21/2020 08:22 A

## 2020-05-26 ENCOUNTER — HOSPITAL ENCOUNTER (OUTPATIENT)
Dept: HOSPITAL 53 - M RAD | Age: 60
End: 2020-05-26
Attending: PHYSICIAN ASSISTANT
Payer: COMMERCIAL

## 2020-05-26 DIAGNOSIS — R60.0: Primary | ICD-10-CM

## 2020-05-26 LAB
ALBUMIN SERPL BCG-MCNC: 3.4 GM/DL (ref 3.2–5.2)
ALT SERPL W P-5'-P-CCNC: 22 U/L (ref 12–78)
BASOPHILS # BLD AUTO: 0.1 10^3/UL (ref 0–0.2)
BASOPHILS NFR BLD AUTO: 0.7 % (ref 0–1)
BILIRUB SERPL-MCNC: 0.7 MG/DL (ref 0.2–1)
BUN SERPL-MCNC: 10 MG/DL (ref 7–18)
CALCIUM SERPL-MCNC: 8.9 MG/DL (ref 8.5–10.1)
CHLORIDE SERPL-SCNC: 106 MEQ/L (ref 98–107)
CO2 SERPL-SCNC: 27 MEQ/L (ref 21–32)
CREAT SERPL-MCNC: 0.68 MG/DL (ref 0.55–1.3)
EOSINOPHIL # BLD AUTO: 0.2 10^3/UL (ref 0–0.5)
EOSINOPHIL NFR BLD AUTO: 2.8 % (ref 0–3)
GFR SERPL CREATININE-BSD FRML MDRD: > 60 ML/MIN/{1.73_M2} (ref 51–?)
GLUCOSE SERPL-MCNC: 79 MG/DL (ref 70–100)
HCT VFR BLD AUTO: 43.1 % (ref 36–47)
HGB BLD-MCNC: 14.1 G/DL (ref 12–15.5)
LYMPHOCYTES # BLD AUTO: 2.1 10^3/UL (ref 1.5–5)
LYMPHOCYTES NFR BLD AUTO: 24.8 % (ref 24–44)
MCH RBC QN AUTO: 31.9 PG (ref 27–33)
MCHC RBC AUTO-ENTMCNC: 32.7 G/DL (ref 32–36.5)
MCV RBC AUTO: 97.5 FL (ref 80–96)
MONOCYTES # BLD AUTO: 0.6 10^3/UL (ref 0–0.8)
MONOCYTES NFR BLD AUTO: 7.1 % (ref 0–5)
NEUTROPHILS # BLD AUTO: 5.5 10^3/UL (ref 1.5–8.5)
NEUTROPHILS NFR BLD AUTO: 64.3 % (ref 36–66)
PLATELET # BLD AUTO: 212 10^3/UL (ref 150–450)
POTASSIUM SERPL-SCNC: 3.9 MEQ/L (ref 3.5–5.1)
PROT SERPL-MCNC: 6.5 GM/DL (ref 6.4–8.2)
RBC # BLD AUTO: 4.42 10^6/UL (ref 4–5.4)
SODIUM SERPL-SCNC: 139 MEQ/L (ref 136–145)
WBC # BLD AUTO: 8.6 10^3/UL (ref 4–10)

## 2020-07-15 ENCOUNTER — HOSPITAL ENCOUNTER (EMERGENCY)
Dept: HOSPITAL 53 - M ED | Age: 60
Discharge: LEFT BEFORE DISCHARGE | End: 2020-07-15
Payer: COMMERCIAL

## 2020-07-15 VITALS
WEIGHT: 182.1 LBS | SYSTOLIC BLOOD PRESSURE: 114 MMHG | HEIGHT: 63 IN | DIASTOLIC BLOOD PRESSURE: 72 MMHG | BODY MASS INDEX: 32.27 KG/M2

## 2020-07-15 DIAGNOSIS — Z53.21: Primary | ICD-10-CM

## 2020-08-28 ENCOUNTER — HOSPITAL ENCOUNTER (OUTPATIENT)
Dept: HOSPITAL 53 - M SFHCADAM | Age: 60
End: 2020-08-28
Attending: FAMILY MEDICINE
Payer: COMMERCIAL

## 2020-08-28 DIAGNOSIS — G62.9: Primary | ICD-10-CM

## 2020-08-28 DIAGNOSIS — R60.0: ICD-10-CM

## 2020-08-28 LAB
BUN SERPL-MCNC: 5 MG/DL (ref 7–18)
CALCIUM SERPL-MCNC: 8.8 MG/DL (ref 8.5–10.1)
CHLORIDE SERPL-SCNC: 105 MEQ/L (ref 98–107)
CO2 SERPL-SCNC: 29 MEQ/L (ref 21–32)
CREAT SERPL-MCNC: 0.72 MG/DL (ref 0.55–1.3)
GFR SERPL CREATININE-BSD FRML MDRD: > 60 ML/MIN/{1.73_M2} (ref 51–?)
GLUCOSE SERPL-MCNC: 70 MG/DL (ref 70–100)
POTASSIUM SERPL-SCNC: 4 MEQ/L (ref 3.5–5.1)
SODIUM SERPL-SCNC: 141 MEQ/L (ref 136–145)

## 2020-09-01 ENCOUNTER — HOSPITAL ENCOUNTER (OUTPATIENT)
Dept: HOSPITAL 53 - M RAD | Age: 60
End: 2020-09-01
Attending: PHYSICIAN ASSISTANT
Payer: COMMERCIAL

## 2020-09-01 ENCOUNTER — HOSPITAL ENCOUNTER (OUTPATIENT)
Dept: HOSPITAL 53 - M LAB | Age: 60
End: 2020-09-01
Attending: PHYSICIAN ASSISTANT
Payer: COMMERCIAL

## 2020-09-01 DIAGNOSIS — R10.816: Primary | ICD-10-CM

## 2020-09-01 LAB
ALBUMIN SERPL BCG-MCNC: 3.4 GM/DL (ref 3.2–5.2)
ALT SERPL W P-5'-P-CCNC: 19 U/L (ref 12–78)
AMYLASE SERPL-CCNC: 45 U/L (ref 25–115)
BASOPHILS # BLD AUTO: 0.1 10^3/UL (ref 0–0.2)
BASOPHILS NFR BLD AUTO: 0.8 % (ref 0–1)
BILIRUB SERPL-MCNC: 0.4 MG/DL (ref 0.2–1)
BUN SERPL-MCNC: 6 MG/DL (ref 7–18)
CALCIUM SERPL-MCNC: 9.1 MG/DL (ref 8.5–10.1)
CHLORIDE SERPL-SCNC: 106 MEQ/L (ref 98–107)
CO2 SERPL-SCNC: 29 MEQ/L (ref 21–32)
CREAT SERPL-MCNC: 0.6 MG/DL (ref 0.55–1.3)
EOSINOPHIL # BLD AUTO: 0.2 10^3/UL (ref 0–0.5)
EOSINOPHIL NFR BLD AUTO: 2 % (ref 0–3)
GFR SERPL CREATININE-BSD FRML MDRD: > 60 ML/MIN/{1.73_M2} (ref 51–?)
GLUCOSE SERPL-MCNC: 92 MG/DL (ref 70–100)
HCT VFR BLD AUTO: 47.8 % (ref 36–47)
HGB BLD-MCNC: 15.7 G/DL (ref 12–15.5)
LIPASE SERPL-CCNC: 66 U/L (ref 73–393)
LYMPHOCYTES # BLD AUTO: 2.4 10^3/UL (ref 1.5–5)
LYMPHOCYTES NFR BLD AUTO: 28 % (ref 24–44)
MCH RBC QN AUTO: 32.6 PG (ref 27–33)
MCHC RBC AUTO-ENTMCNC: 32.8 G/DL (ref 32–36.5)
MCV RBC AUTO: 99.2 FL (ref 80–96)
MONOCYTES # BLD AUTO: 0.6 10^3/UL (ref 0–0.8)
MONOCYTES NFR BLD AUTO: 6.6 % (ref 0–5)
NEUTROPHILS # BLD AUTO: 5.4 10^3/UL (ref 1.5–8.5)
NEUTROPHILS NFR BLD AUTO: 62.4 % (ref 36–66)
PLATELET # BLD AUTO: 206 10^3/UL (ref 150–450)
POTASSIUM SERPL-SCNC: 4.2 MEQ/L (ref 3.5–5.1)
PROT SERPL-MCNC: 6.7 GM/DL (ref 6.4–8.2)
RBC # BLD AUTO: 4.82 10^6/UL (ref 4–5.4)
SODIUM SERPL-SCNC: 142 MEQ/L (ref 136–145)
WBC # BLD AUTO: 8.6 10^3/UL (ref 4–10)

## 2020-09-28 NOTE — REP
CONTRAST ENHANCED CT OF THE ABDOMEN AND PELVIS



CLINICAL: Abdominal pain.



TECHNIQUE: Axial contrast enhanced images from the lung bases to the pubic 
symphysis using oral (per protocol) and 100 cc Isovue-370 intravenous contrast 
material with coronal and sagittal reformations. 



COMPARISON: None. 



FINDINGS: 



Abdominal aortic aneurysm measures 4.5 cm maximal diameter and appears partially
thrombosed. The aneurysm originates just below the level of the renal arteries 
and terminates at the level of the bifurcation where thrombosed occlusion of the
left common iliac artery is noted. There is subsequent revascularization of the 
left common iliac artery at the bifurcation to internal and external arteries 
likely secondary to collateral vessels from the pelvis and right side. 
Associated atherosclerotic changes of the aorta and branch vessels noted. The 
celiac access, superior mesenteric artery, bilateral renal arteries, and right 
iliac artery demonstrate satisfactory perfusion. The kidneys demonstrate 
symmetric enhancement without atrophy. 



Liver, spleen, pancreas, bilateral adrenal glands, and kidneys are normal. 
Evidence for prior cholecystectomy. Evaluation of the enteric system 
demonstrates circumferential mucosa thickening at the level of the hepatic 
flexure which represent peristalsis, although an underlying neoplastic lesion 
cannot definitively be excluded. There is no evidence for bowel obstruction, and
the remainder of the small and large bowel appears grossly normal. 



Pelvis demonstrates normal bladder and age-appropriate uterus/adnexa. No 
ascites. No free air. No adenopathy. Musculoskeletal structures demonstrate 
degenerative changes without acute osseous abnormality. Lung bases are clear. 



IMPRESSION: 

* Extensive atherosclerotic disease with abdominal aortic aneurysm measuring 4.5
  cm maximal diameter and findings as described above. 

* Focal circumferential submucosal thickening and luminal narrowing at the 
  hepatic flexure of the colon warrants colonoscopy to exclude underlying 
  pathology such as malignancy. 

* Chronic further changes as above. 

* Preliminary report faxed to clinician at the time of examination.

[facility]

## 2021-01-27 ENCOUNTER — HOSPITAL ENCOUNTER (OUTPATIENT)
Dept: HOSPITAL 53 - M RAD | Age: 61
End: 2021-01-27
Attending: NURSE PRACTITIONER
Payer: COMMERCIAL

## 2021-01-27 DIAGNOSIS — M48.061: ICD-10-CM

## 2021-01-27 DIAGNOSIS — M51.16: Primary | ICD-10-CM

## 2021-01-27 NOTE — REPVR
PROCEDURE INFORMATION: 

Exam: CT Lumbar Spine Without Contrast 

Exam date and time: 1/27/2021 9:26 AM 

Age: 60 years old 

Clinical indication: Low back pain; Additional info: Lumbar radiculopathy 



TECHNIQUE: 

Imaging protocol: Computed tomography images of the lumbar spine without 

contrast. 

Radiation optimization: All CT scans at this facility use at least one of these 

dose optimization techniques: automated exposure control; mA and/or kV 

adjustment per patient size (includes targeted exams where dose is matched to 

clinical indication); or iterative reconstruction. 



COMPARISON: 

MRI-Spine, L.S. without con 10/7/2016 9:12 AM 



FINDINGS: 

Vertebrae: No acute fracture. Diffuse demineralization of the bones. Mild loss 

of disc spaces with disc vacuum phenomena at multiple levels. Minimal 

retrolisthesis of L4 on L5. Multilevel anterior osteophyte formation and facet 

joint arthropathy.

L1-L2: Mild diffuse disc bulge with bilateral facet joint arthropathy without 

any significant central spinal canal stenosis or neural foraminal narrowing.

L2-L3: Diffuse disc bulge with bilateral facet joint arthropathy without any 

significant central spinal canal stenosis or neural foraminal narrowing.

L3-L4: Diffuse disc bulge with bilateral facet joint arthropathy and ligamentum 

flavum hypertrophy resulting in mild central spinal canal stenosis and mild 

bilateral neural foraminal narrowing.

L4-L5: Diffuse disc bulge with bilateral facet joint arthropathy and ligamentum 

flavum hypertrophy resulting in mild central spinal canal stenosis and mild 

bilateral neural foraminal narrowing.

L5-S1: Diffuse disc bulge with bilateral facet joint arthropathy resulting in 

mild central spinal canal stenosis, moderate right and mild to moderate left 

neural foraminal narrowing.

Soft tissues: Unremarkable. 

Aortic stent graft extending into the right common iliac artery. Aortic 

aneurysm measuring about 4.5 x 4.2 mm. No retroperitoneal hematoma formation.

Status post cholecystectomy.

Few colonic diverticula.





IMPRESSION: 

Multilevel degenerative changes as described in detail above. Please see above 

dictation for individual levels.



Electronically signed by: Rylee Rodriguez On 01/27/2021  10:02:19 AM

## 2021-02-01 ENCOUNTER — HOSPITAL ENCOUNTER (OUTPATIENT)
Dept: HOSPITAL 53 - M LAB REF | Age: 61
End: 2021-02-01
Attending: NURSE PRACTITIONER
Payer: COMMERCIAL

## 2021-02-01 DIAGNOSIS — L03.114: Primary | ICD-10-CM

## 2021-03-12 ENCOUNTER — HOSPITAL ENCOUNTER (OUTPATIENT)
Dept: HOSPITAL 53 - M LABSMTC | Age: 61
End: 2021-03-12
Attending: ANESTHESIOLOGY
Payer: COMMERCIAL

## 2021-03-12 DIAGNOSIS — Z20.822: ICD-10-CM

## 2021-03-12 DIAGNOSIS — Z01.812: Primary | ICD-10-CM

## 2021-03-17 ENCOUNTER — HOSPITAL ENCOUNTER (OUTPATIENT)
Dept: HOSPITAL 53 - M OPP | Age: 61
Discharge: HOME | End: 2021-03-17
Attending: SURGERY
Payer: COMMERCIAL

## 2021-03-17 VITALS — DIASTOLIC BLOOD PRESSURE: 74 MMHG | SYSTOLIC BLOOD PRESSURE: 146 MMHG

## 2021-03-17 VITALS — WEIGHT: 168 LBS | HEIGHT: 63 IN | BODY MASS INDEX: 29.77 KG/M2

## 2021-03-17 DIAGNOSIS — Z79.891: ICD-10-CM

## 2021-03-17 DIAGNOSIS — Z88.2: ICD-10-CM

## 2021-03-17 DIAGNOSIS — D12.3: Primary | ICD-10-CM

## 2021-03-17 DIAGNOSIS — F17.210: ICD-10-CM

## 2021-03-17 DIAGNOSIS — Z88.0: ICD-10-CM

## 2021-03-17 DIAGNOSIS — I71.4: ICD-10-CM

## 2021-03-17 DIAGNOSIS — R93.3: ICD-10-CM

## 2021-03-17 DIAGNOSIS — I10: ICD-10-CM

## 2021-03-17 DIAGNOSIS — Z79.899: ICD-10-CM

## 2021-03-17 NOTE — ROOR
________________________________________________________________________________

Patient Name: Silvia Nicole          Procedure Date: 3/17/2021 8:56 AM

MRN: [id number]                          Account Number: [id number]

YOB: 1960              Age: 60

Room: [facility]                            Gender: Female

Note Status: Finalized                 

________________________________________________________________________________

 

Procedure:            Colonoscopy

Indications:          Abnormal CT of the GI tract

Providers:            DO Forrest Harris MD:         ASHWIN Delgado

Requesting Provider:  

Medicines:            Propofol per Anesthesia

Complications:        No immediate complications.

________________________________________________________________________________

Procedure:            Pre-Anesthesia Assessment:

                      - Prior to the procedure, a History and Physical was 

                      performed, and patient medications and allergies were 

                      reviewed. The patient is competent. The risks and 

                      benefits of the procedure and the sedation options and 

                      risks were discussed with the patient. All questions 

                      were answered and informed consent was obtained. Patient 

                      identification and proposed procedure were verified by 

                      the physician, the nurse, the anesthetist and the 

                      technician in the endoscopy suite. Mental Status 

                      Examination: alert and oriented. Airway Examination: 

                      normal oropharyngeal airway and neck mobility. 

                      Respiratory Examination: clear to auscultation. CV 

                      Examination: normal. Prophylactic Antibiotics: The 

                      patient does not require prophylactic antibiotics. Prior 

                      Anticoagulants: The patient has taken no previous 

                      anticoagulant or antiplatelet agents. ASA Grade 

                      Assessment: II - A patient with mild systemic disease. 

                      After reviewing the risks and benefits, the patient was 

                      deemed in satisfactory condition to undergo the 

                      procedure. The anesthesia plan was to use monitored 

                      anesthesia care (MAC). Immediately prior to 

                      administration of medications, the patient was 

                      re-assessed for adequacy to receive sedatives. The heart 

                      rate, respiratory rate, oxygen saturations, blood 

                      pressure, adequacy of pulmonary ventilation, and 

                      response to care were monitored throughout the 

                      procedure. The physical status of the patient was 

                      re-assessed after the procedure.

                      The Colonoscope was introduced through the anus and 

                      advanced to the cecum, identified by appendiceal orifice 

                      and ileocecal valve. The colonoscopy was performed 

                      without difficulty. The patient tolerated the procedure 

                      well.

                                                                                

Findings:

     Two multi-lobulated polyps were found in the hepatic flexure. The polyps 

     were 4 to 20 mm in size. These polyps were removed with a hot snare. 

     Resection and retrieval were complete. Estimated blood loss was minimal.

                                                                                

Impression:           - Two 4 to 20 mm polyps at the hepatic flexure, removed 

                      with a hot snare. Resected and retrieved.

Recommendation:       - Patient has a contact number available for 

                      emergencies. The signs and symptoms of potential delayed 

                      complications were discussed with the patient. Return to 

                      normal activities tomorrow. Written discharge 

                      instructions were provided to the patient.

                      - Await pathology results.

                      - Repeat colonoscopy in 1 year for surveillance after 

                      piecemeal polypectomy.

                      - Return to my office at appointment to be scheduled.

                      - Await pathology results.

                                                                                

Procedure Code(s):    --- Professional ---

                      58122, Colonoscopy, flexible; with removal of tumor(s), 

                      polyp(s), or other lesion(s) by snare technique

Diagnosis Code(s):    --- Professional ---

                      K63.5, Polyp of colon

                      R93.3, Abnormal findings on diagnostic imaging of other 

                      parts of digestive tract

 

CPT copyright 2019 [employer]. All rights reserved.

 

The codes documented in this report are preliminary and upon  review may 

be revised to meet current compliance requirements.

 

_________________

Wyatt Rahman DO

3/17/2021 9:41:23 AM

Electronically signed by Wyatt Rahman DO

Number of Addenda: 0

 

Note Initiated On: 3/17/2021 8:56 AM

Estimated Blood Loss: Estimated blood loss was minimal.

## 2021-03-29 ENCOUNTER — HOSPITAL ENCOUNTER (OUTPATIENT)
Dept: HOSPITAL 53 - M RAD | Age: 61
End: 2021-03-29
Attending: PHYSICIAN ASSISTANT
Payer: COMMERCIAL

## 2021-03-29 DIAGNOSIS — F17.218: Primary | ICD-10-CM

## 2021-03-29 NOTE — REP
INDICATION:

NICOTINE DEPEND.



COMPARISON:

Comparison screening chest CT study February 10, 2020.  Comparison CT pulmonary

angiogram August 7, 2018..



TECHNIQUE:

Dose reduction was performed utilizing CARE dose with automated adjustment of the kV

and MAS according to patient size; iterative reconstruction, automated exposure

control, as well as adaptive dose shielding.  Helical scanning is acquired and 3 mm

axial images are re-formatted at lung only windows.



FINDINGS:

Digital preliminary  radiograph is unremarkable.  There are clips in right upper

quadrant of the abdomen.  Vascular calcification is observed unchanged.  No lung mass

or new pulmonary nodule is appreciated.  No infiltrate or atelectasis is seen.



IMPRESSION:

Stable lung RADS category 1 findings.  Repeat screening exam suggested in 1 year.





<Electronically signed by Genaro Torres > 03/29/21 [id number]

## 2021-04-01 ENCOUNTER — HOSPITAL ENCOUNTER (OUTPATIENT)
Dept: HOSPITAL 53 - M LABDRWAD | Age: 61
End: 2021-04-01
Attending: INTERNAL MEDICINE
Payer: COMMERCIAL

## 2021-04-01 DIAGNOSIS — E78.00: Primary | ICD-10-CM

## 2021-04-01 LAB
CHOLEST SERPL-MCNC: 182 MG/DL (ref ?–200)
CHOLEST/HDLC SERPL: 3.31 {RATIO} (ref ?–5)
HDLC SERPL-MCNC: 55 MG/DL (ref 40–?)
LDLC SERPL CALC-MCNC: 111 MG/DL (ref ?–100)
NONHDLC SERPL-MCNC: 127 MG/DL
TRIGL SERPL-MCNC: 81 MG/DL (ref ?–150)

## 2021-07-08 ENCOUNTER — HOSPITAL ENCOUNTER (EMERGENCY)
Dept: HOSPITAL 53 - M ED | Age: 61
Discharge: HOME | End: 2021-07-08
Payer: COMMERCIAL

## 2021-07-08 VITALS — HEIGHT: 63 IN | WEIGHT: 171.52 LBS | BODY MASS INDEX: 30.39 KG/M2

## 2021-07-08 VITALS — DIASTOLIC BLOOD PRESSURE: 79 MMHG | SYSTOLIC BLOOD PRESSURE: 142 MMHG

## 2021-07-08 DIAGNOSIS — I10: ICD-10-CM

## 2021-07-08 DIAGNOSIS — Z79.899: ICD-10-CM

## 2021-07-08 DIAGNOSIS — Z88.1: ICD-10-CM

## 2021-07-08 DIAGNOSIS — G62.9: ICD-10-CM

## 2021-07-08 DIAGNOSIS — F17.210: ICD-10-CM
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
## 2021-07-08 DIAGNOSIS — Z79.01: ICD-10-CM
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
## 2021-07-08 DIAGNOSIS — J44.9: ICD-10-CM

## 2021-07-08 DIAGNOSIS — Z88.2: ICD-10-CM

## 2021-07-08 DIAGNOSIS — S59.902A: Primary | ICD-10-CM
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
## 2021-07-08 DIAGNOSIS — Z88.0: ICD-10-CM

## 2021-07-08 NOTE — REP
INDICATION:

fell



COMPARISON:

None.



TECHNIQUE:

AP, lateral, bilateral oblique views left wrist.



FINDINGS:

The carpal bones, surrounding osseous structures, soft tissues, and joint spaces are

normal.  There is no evidence for acute fracture or dislocation.  No subcutaneous

emphysema or radiodense foreign body.



IMPRESSION:

Normal wrist series.  No acute fracture or dislocation.





<Electronically signed by Marcos Stroud > 07/08/21 [id number]

## 2021-07-08 NOTE — REP
INDICATION:

fell



COMPARISON:

None.



TECHNIQUE:

Internal rotation, external rotation, and Y view.



FINDINGS:

Generalized age-related changes are appreciated at the acromioclavicular and

glenohumeral joints.  The subacromial space is normal.  There is no evidence for acute

fracture or dislocation.



IMPRESSION:

No acute fracture or dislocation.





<Electronically signed by Marcos Stroud > 07/08/21 [id number]

## 2021-07-08 NOTE — REP
INDICATION:

fell



COMPARISON:

None.



TECHNIQUE:

AP, lateral, bilateral oblique views of the left elbow.



FINDINGS:

No significant swelling.  Age-related changes are appreciated.  A very subtle

nondisplaced radial head fracture cannot definitively be excluded and should be

correlated with physical examination.



IMPRESSION:

No definite acute injury.  However a very subtle nondisplaced radial head fracture

cannot be excluded and should be correlated with physical examination.  Consider

re-evaluation in 5-7 days if the patient remains symptomatic.





<Electronically signed by Marcos Stroud > 07/08/21 [id number]

## 2021-07-22 ENCOUNTER — HOSPITAL ENCOUNTER (OUTPATIENT)
Dept: HOSPITAL 53 - M SOG | Age: 61
End: 2021-07-22
Attending: ORTHOPAEDIC SURGERY
Payer: COMMERCIAL

## 2021-07-22 DIAGNOSIS — S52.125A: Primary | ICD-10-CM

## 2021-07-22 NOTE — REP
INDICATION:

PAIN LEFT ELBOW.



COMPARISON:

Comparison radiographs of the left elbow are from July 8, 2021..



TECHNIQUE:

Four views of the left elbow are provided.



FINDINGS:

The today's left elbow radiographs demonstrate cortical step-off and slight impaction

in the proximal radial head confirming the presence of a nondisplaced radial head

fracture.  There is a tiny spur on the coronoid process of the proximal ulna.  No

ulnar or humeral fracture is seen.  No evidence of joint effusion is seen on the

lateral radiograph although it is not a 90 degree lateral.



IMPRESSION:

Slightly impacted nondisplaced fracture of the proximal radial head.





<Electronically signed by Genaro Torres > 07/22/21 [id number]

## 2021-11-08 ENCOUNTER — HOSPITAL ENCOUNTER (OUTPATIENT)
Dept: HOSPITAL 53 - M SFHCADAM | Age: 61
End: 2021-11-08
Attending: PHYSICIAN ASSISTANT
Payer: COMMERCIAL

## 2021-11-08 ENCOUNTER — HOSPITAL ENCOUNTER (OUTPATIENT)
Dept: HOSPITAL 53 - M ADAMS | Age: 61
End: 2021-11-08
Attending: NURSE PRACTITIONER
Payer: COMMERCIAL

## 2021-11-08 DIAGNOSIS — R53.82: ICD-10-CM

## 2021-11-08 DIAGNOSIS — I10: ICD-10-CM

## 2021-11-08 DIAGNOSIS — J44.9: Primary | ICD-10-CM

## 2021-11-08 DIAGNOSIS — M25.551: Primary | ICD-10-CM

## 2021-11-08 DIAGNOSIS — F17.210: ICD-10-CM

## 2021-11-08 DIAGNOSIS — Z98.890: ICD-10-CM

## 2021-11-08 DIAGNOSIS — M25.552: ICD-10-CM

## 2021-11-08 DIAGNOSIS — M50.30: ICD-10-CM

## 2021-11-08 LAB
25(OH)D3 SERPL-MCNC: 22.3 NG/ML (ref 30–100)
ALBUMIN SERPL BCG-MCNC: 3.5 GM/DL (ref 3.2–5.2)
ALT SERPL W P-5'-P-CCNC: 23 U/L (ref 12–78)
BILIRUB SERPL-MCNC: 0.4 MG/DL (ref 0.2–1)
BUN SERPL-MCNC: 7 MG/DL (ref 7–18)
CALCIUM SERPL-MCNC: 9.2 MG/DL (ref 8.5–10.1)
CHLORIDE SERPL-SCNC: 109 MEQ/L (ref 98–107)
CO2 SERPL-SCNC: 29 MEQ/L (ref 21–32)
CREAT SERPL-MCNC: 0.58 MG/DL (ref 0.55–1.3)
FOLATE SERPL-MCNC: 11.3 NG/ML
GFR SERPL CREATININE-BSD FRML MDRD: > 60 ML/MIN/{1.73_M2} (ref 51–?)
GLUCOSE SERPL-MCNC: 92 MG/DL (ref 70–100)
HCT VFR BLD AUTO: 47.7 % (ref 36–47)
HGB BLD-MCNC: 15.7 G/DL (ref 12–15.5)
MCH RBC QN AUTO: 32.7 PG (ref 27–33)
MCHC RBC AUTO-ENTMCNC: 32.9 G/DL (ref 32–36.5)
MCV RBC AUTO: 99.4 FL (ref 80–96)
PLATELET # BLD AUTO: 159 10^3/UL (ref 150–450)
POTASSIUM SERPL-SCNC: 4.4 MEQ/L (ref 3.5–5.1)
PROT SERPL-MCNC: 6.7 GM/DL (ref 6.4–8.2)
RBC # BLD AUTO: 4.8 10^6/UL (ref 4–5.4)
SODIUM SERPL-SCNC: 141 MEQ/L (ref 136–145)
T4 FREE SERPL-MCNC: 1.15 NG/DL (ref 0.76–1.46)
TSH SERPL DL<=0.005 MIU/L-ACNC: 0.73 UIU/ML (ref 0.36–3.74)
VIT B12 SERPL-MCNC: 318 PG/ML
WBC # BLD AUTO: 7.7 10^3/UL (ref 4–10)

## 2021-11-08 NOTE — REP
INDICATION:

BILATERAL HIP PAIN.



COMPARISON:

None.



TECHNIQUE:

A single AP view of the pelvis was performed.



FINDINGS:

The hip joint spaces are symmetric and relatively well maintained.  There is no acute

fracture or destructive osseous lesion.



IMPRESSION:

Within normal limits





<Electronically signed by Kevin Zamora > 11/08/21 [id number]

## 2021-11-08 NOTE — REP
INDICATION:

BILATERAL HIP PAIN.



COMPARISON:

None



TECHNIQUE:

AP and frog-lateral views bilateral



FINDINGS:

The hip joint spaces are symmetric and well maintained.  There is no acute fracture,

dislocation, or subluxation.  There is no prominent marginal osteophytosis.  The

femoral heads are spherical in shape and symmetric in appearance there is no evidence

of buttressing.  There is no destructive osseous lesion.



IMPRESSION:

Within normal limits





<Electronically signed by Kevin Zamora > 11/08/21 [id number] 1-2 cups/cans per day

## 2022-05-05 ENCOUNTER — HOSPITAL ENCOUNTER (EMERGENCY)
Dept: HOSPITAL 53 - M ED | Age: 62
Discharge: HOME | End: 2022-05-05
Payer: COMMERCIAL

## 2022-05-05 VITALS — BODY MASS INDEX: 30.54 KG/M2 | WEIGHT: 172.36 LBS | HEIGHT: 63 IN

## 2022-05-05 VITALS — SYSTOLIC BLOOD PRESSURE: 146 MMHG | DIASTOLIC BLOOD PRESSURE: 68 MMHG

## 2022-05-05 DIAGNOSIS — F17.210: ICD-10-CM

## 2022-05-05 DIAGNOSIS — E78.5: ICD-10-CM

## 2022-05-05 DIAGNOSIS — G62.9: ICD-10-CM

## 2022-05-05 DIAGNOSIS — I10: Primary | ICD-10-CM

## 2022-05-05 DIAGNOSIS — Z79.899: ICD-10-CM

## 2022-05-05 DIAGNOSIS — I45.19: ICD-10-CM

## 2022-05-05 DIAGNOSIS — Z79.01: ICD-10-CM

## 2022-05-05 DIAGNOSIS — R00.2: ICD-10-CM

## 2022-05-05 DIAGNOSIS — Z88.0: ICD-10-CM

## 2022-05-05 DIAGNOSIS — Z88.2: ICD-10-CM

## 2022-05-05 DIAGNOSIS — Z88.1: ICD-10-CM

## 2022-05-05 LAB
BASOPHILS # BLD AUTO: 0.1 10^3/UL (ref 0–0.2)
BASOPHILS NFR BLD AUTO: 0.7 % (ref 0–1)
BUN SERPL-MCNC: 10 MG/DL (ref 7–18)
CALCIUM SERPL-MCNC: 9.3 MG/DL (ref 8.5–10.1)
CHLORIDE SERPL-SCNC: 108 MEQ/L (ref 98–107)
CK MB CFR.DF SERPL CALC: 1.37
CK MB CFR.DF SERPL CALC: 1.58
CK MB SERPL-MCNC: 1.6 NG/ML (ref ?–3.6)
CK MB SERPL-MCNC: 1.9 NG/ML (ref ?–3.6)
CK SERPL-CCNC: 101 U/L (ref 26–192)
CK SERPL-CCNC: 139 U/L (ref 26–192)
CO2 SERPL-SCNC: 27 MEQ/L (ref 21–32)
CREAT SERPL-MCNC: 0.49 MG/DL (ref 0.55–1.3)
EOSINOPHIL # BLD AUTO: 0.2 10^3/UL (ref 0–0.5)
EOSINOPHIL NFR BLD AUTO: 2.6 % (ref 0–3)
GFR SERPL CREATININE-BSD FRML MDRD: > 60 ML/MIN/{1.73_M2} (ref 51–?)
GLUCOSE SERPL-MCNC: 105 MG/DL (ref 70–100)
HCT VFR BLD AUTO: 44 % (ref 36–47)
HGB BLD-MCNC: 14.8 G/DL (ref 12–15.5)
LYMPHOCYTES # BLD AUTO: 1.5 10^3/UL (ref 1.5–5)
LYMPHOCYTES NFR BLD AUTO: 21.1 % (ref 24–44)
MAGNESIUM SERPL-MCNC: 2.2 MG/DL (ref 1.8–2.4)
MCH RBC QN AUTO: 32.9 PG (ref 27–33)
MCHC RBC AUTO-ENTMCNC: 33.6 G/DL (ref 32–36.5)
MCV RBC AUTO: 97.8 FL (ref 80–96)
MONOCYTES # BLD AUTO: 0.4 10^3/UL (ref 0–0.8)
MONOCYTES NFR BLD AUTO: 6.3 % (ref 2–8)
NEUTROPHILS # BLD AUTO: 4.8 10^3/UL (ref 1.5–8.5)
NEUTROPHILS NFR BLD AUTO: 68.9 % (ref 36–66)
PLATELET # BLD AUTO: 166 10^3/UL (ref 150–450)
POTASSIUM SERPL-SCNC: 5.9 MEQ/L (ref 3.5–5.1)
RBC # BLD AUTO: 4.5 10^6/UL (ref 4–5.4)
SODIUM SERPL-SCNC: 139 MEQ/L (ref 136–145)
T4 FREE SERPL-MCNC: 1.03 NG/DL (ref 0.76–1.46)
TSH SERPL DL<=0.005 MIU/L-ACNC: 0.92 UIU/ML (ref 0.36–3.74)
WBC # BLD AUTO: 7 10^3/UL (ref 4–10)

## 2022-05-20 ENCOUNTER — HOSPITAL ENCOUNTER (OUTPATIENT)
Dept: HOSPITAL 53 - M ADAMS | Age: 62
End: 2022-05-20
Attending: PHYSICIAN ASSISTANT
Payer: COMMERCIAL

## 2022-05-20 ENCOUNTER — HOSPITAL ENCOUNTER (OUTPATIENT)
Dept: HOSPITAL 53 - M SFHCADAM | Age: 62
End: 2022-05-20
Attending: PHYSICIAN ASSISTANT
Payer: COMMERCIAL

## 2022-05-20 DIAGNOSIS — I10: ICD-10-CM

## 2022-05-20 DIAGNOSIS — E78.00: Primary | ICD-10-CM

## 2022-05-20 DIAGNOSIS — J40: Primary | ICD-10-CM

## 2022-05-20 DIAGNOSIS — Z91.89: ICD-10-CM

## 2022-05-20 DIAGNOSIS — F17.218: ICD-10-CM

## 2022-05-20 LAB
ALBUMIN SERPL BCG-MCNC: 3.7 GM/DL (ref 3.2–5.2)
ALT SERPL W P-5'-P-CCNC: 21 U/L (ref 12–78)
BASOPHILS # BLD AUTO: 0.1 10^3/UL (ref 0–0.2)
BASOPHILS NFR BLD AUTO: 0.8 % (ref 0–1)
BILIRUB SERPL-MCNC: 0.6 MG/DL (ref 0.2–1)
BUN SERPL-MCNC: 8 MG/DL (ref 7–18)
CALCIUM SERPL-MCNC: 9.2 MG/DL (ref 8.5–10.1)
CHLORIDE SERPL-SCNC: 105 MEQ/L (ref 98–107)
CHOLEST SERPL-MCNC: 173 MG/DL (ref ?–200)
CHOLEST/HDLC SERPL: 3.46 {RATIO} (ref ?–5)
CO2 SERPL-SCNC: 30 MEQ/L (ref 21–32)
CREAT SERPL-MCNC: 0.64 MG/DL (ref 0.55–1.3)
EOSINOPHIL # BLD AUTO: 0.2 10^3/UL (ref 0–0.5)
EOSINOPHIL NFR BLD AUTO: 3.2 % (ref 0–3)
GFR SERPL CREATININE-BSD FRML MDRD: > 60 ML/MIN/{1.73_M2} (ref 51–?)
GLUCOSE SERPL-MCNC: 92 MG/DL (ref 70–100)
HCT VFR BLD AUTO: 45.5 % (ref 36–47)
HDLC SERPL-MCNC: 50 MG/DL (ref 40–?)
HGB BLD-MCNC: 15.1 G/DL (ref 12–15.5)
LDLC SERPL CALC-MCNC: 100 MG/DL (ref ?–100)
LYMPHOCYTES # BLD AUTO: 1.5 10^3/UL (ref 1.5–5)
LYMPHOCYTES NFR BLD AUTO: 23.6 % (ref 24–44)
MCH RBC QN AUTO: 33 PG (ref 27–33)
MCHC RBC AUTO-ENTMCNC: 33.2 G/DL (ref 32–36.5)
MCV RBC AUTO: 99.3 FL (ref 80–96)
MONOCYTES # BLD AUTO: 0.4 10^3/UL (ref 0–0.8)
MONOCYTES NFR BLD AUTO: 6.1 % (ref 2–8)
NEUTROPHILS # BLD AUTO: 4.1 10^3/UL (ref 1.5–8.5)
NEUTROPHILS NFR BLD AUTO: 66 % (ref 36–66)
NONHDLC SERPL-MCNC: 123 MG/DL
PLATELET # BLD AUTO: 164 10^3/UL (ref 150–450)
POTASSIUM SERPL-SCNC: 3.9 MEQ/L (ref 3.5–5.1)
PROT SERPL-MCNC: 6.6 GM/DL (ref 6.4–8.2)
RBC # BLD AUTO: 4.58 10^6/UL (ref 4–5.4)
SODIUM SERPL-SCNC: 140 MEQ/L (ref 136–145)
TRIGL SERPL-MCNC: 115 MG/DL (ref ?–150)
WBC # BLD AUTO: 6.2 10^3/UL (ref 4–10)

## 2022-08-02 ENCOUNTER — HOSPITAL ENCOUNTER (OUTPATIENT)
Dept: HOSPITAL 53 - M PAIN | Age: 62
End: 2022-08-02
Attending: FAMILY MEDICINE
Payer: COMMERCIAL

## 2022-08-02 DIAGNOSIS — M54.50: ICD-10-CM

## 2022-08-02 DIAGNOSIS — Z88.2: ICD-10-CM

## 2022-08-02 DIAGNOSIS — G60.8: ICD-10-CM

## 2022-08-02 DIAGNOSIS — I73.9: ICD-10-CM

## 2022-08-02 DIAGNOSIS — J44.9: ICD-10-CM

## 2022-08-02 DIAGNOSIS — Z79.899: ICD-10-CM

## 2022-08-02 DIAGNOSIS — E78.5: ICD-10-CM

## 2022-08-02 DIAGNOSIS — G62.9: ICD-10-CM

## 2022-08-02 DIAGNOSIS — Z79.891: ICD-10-CM

## 2022-08-02 DIAGNOSIS — Z79.02: ICD-10-CM

## 2022-08-02 DIAGNOSIS — M54.2: Primary | ICD-10-CM

## 2022-08-02 DIAGNOSIS — Z88.0: ICD-10-CM

## 2022-08-02 DIAGNOSIS — I10: ICD-10-CM

## 2022-08-02 DIAGNOSIS — I73.00: ICD-10-CM

## 2022-08-02 DIAGNOSIS — F17.210: ICD-10-CM

## 2022-08-02 DIAGNOSIS — Z88.1: ICD-10-CM

## 2022-08-02 DIAGNOSIS — R49.0: ICD-10-CM

## 2022-10-11 ENCOUNTER — HOSPITAL ENCOUNTER (OUTPATIENT)
Dept: HOSPITAL 53 - M RAD | Age: 62
End: 2022-10-11
Attending: PHYSICIAN ASSISTANT
Payer: COMMERCIAL

## 2022-10-11 DIAGNOSIS — I25.10: ICD-10-CM

## 2022-10-11 DIAGNOSIS — I70.0: ICD-10-CM

## 2022-10-11 DIAGNOSIS — F17.218: Primary | ICD-10-CM

## 2022-11-01 ENCOUNTER — HOSPITAL ENCOUNTER (OUTPATIENT)
Dept: HOSPITAL 53 - M PLALAB | Age: 62
End: 2022-11-01
Attending: PHYSICIAN ASSISTANT
Payer: COMMERCIAL

## 2022-11-01 DIAGNOSIS — R05.1: Primary | ICD-10-CM

## 2022-12-14 ENCOUNTER — HOSPITAL ENCOUNTER (OUTPATIENT)
Dept: HOSPITAL 53 - M SFHCADAM | Age: 62
End: 2022-12-14
Attending: PHYSICIAN ASSISTANT
Payer: COMMERCIAL

## 2022-12-14 DIAGNOSIS — J44.1: Primary | ICD-10-CM

## 2022-12-15 ENCOUNTER — HOSPITAL ENCOUNTER (INPATIENT)
Dept: HOSPITAL 53 - M ED | Age: 62
LOS: 5 days | Discharge: HOME HEALTH | DRG: 140 | End: 2022-12-20
Attending: INTERNAL MEDICINE | Admitting: FAMILY MEDICINE
Payer: COMMERCIAL

## 2022-12-15 VITALS — BODY MASS INDEX: 29.3 KG/M2 | HEIGHT: 63 IN | WEIGHT: 165.35 LBS

## 2022-12-15 DIAGNOSIS — R59.0: ICD-10-CM

## 2022-12-15 DIAGNOSIS — G62.9: ICD-10-CM

## 2022-12-15 DIAGNOSIS — Z88.2: ICD-10-CM

## 2022-12-15 DIAGNOSIS — Z90.49: ICD-10-CM

## 2022-12-15 DIAGNOSIS — I73.9: ICD-10-CM

## 2022-12-15 DIAGNOSIS — J44.1: Primary | ICD-10-CM

## 2022-12-15 DIAGNOSIS — Z79.899: ICD-10-CM

## 2022-12-15 DIAGNOSIS — Z99.81: ICD-10-CM

## 2022-12-15 DIAGNOSIS — Z79.891: ICD-10-CM

## 2022-12-15 DIAGNOSIS — Z79.02: ICD-10-CM

## 2022-12-15 DIAGNOSIS — Z88.0: ICD-10-CM

## 2022-12-15 DIAGNOSIS — I10: ICD-10-CM

## 2022-12-15 DIAGNOSIS — J45.909: ICD-10-CM

## 2022-12-15 DIAGNOSIS — Z71.6: ICD-10-CM

## 2022-12-15 DIAGNOSIS — F17.200: ICD-10-CM

## 2022-12-15 DIAGNOSIS — I71.40: ICD-10-CM

## 2022-12-15 LAB
ALBUMIN SERPL BCG-MCNC: 3.2 G/DL (ref 3.2–5.2)
ALP SERPL-CCNC: 93 U/L (ref 46–116)
ALT SERPL W P-5'-P-CCNC: 26 U/L (ref 7–40)
AST SERPL-CCNC: 51 U/L (ref ?–34)
BASE EXCESS BLDA CALC-SCNC: 0.7 MMOL/L (ref -2–2)
BASOPHILS # BLD AUTO: 0 10^3/UL (ref 0–0.2)
BASOPHILS NFR BLD AUTO: 0.4 % (ref 0–1)
BILIRUB CONJ SERPL-MCNC: 0.1 MG/DL (ref ?–0.4)
BILIRUB SERPL-MCNC: 0.4 MG/DL (ref 0.3–1.2)
BUN SERPL-MCNC: 11 MG/DL (ref 9–23)
CALCIUM SERPL-MCNC: 8.4 MG/DL (ref 8.3–10.6)
CHLORIDE SERPL-SCNC: 105 MMOL/L (ref 98–107)
CK MB CFR.DF SERPL CALC: 1.11
CK MB SERPL-MCNC: < 1 NG/ML (ref ?–3.6)
CK SERPL-CCNC: 90 U/L (ref 34–145)
CO2 BLDA CALC-SCNC: 27.5 MEQ/L (ref 23–31)
CO2 SERPL-SCNC: 24 MMOL/L (ref 20–31)
CREAT SERPL-MCNC: 0.54 MG/DL (ref 0.55–1.3)
EOSINOPHIL # BLD AUTO: 0 10^3/UL (ref 0–0.5)
EOSINOPHIL NFR BLD AUTO: 0.2 % (ref 0–3)
GFR SERPL CREATININE-BSD FRML MDRD: > 60 ML/MIN/{1.73_M2} (ref 45–?)
GLUCOSE SERPL-MCNC: 105 MG/DL (ref 74–106)
HCO3 BLDA-SCNC: 26.1 MEQ/L (ref 22–26)
HCO3 STD BLDA-SCNC: 24.8 MEQ/L (ref 22–26)
HCT VFR BLD AUTO: 45.9 % (ref 36–47)
HGB BLD-MCNC: 14.8 G/DL (ref 12–15.5)
LYMPHOCYTES # BLD AUTO: 0.9 10^3/UL (ref 1.5–5)
LYMPHOCYTES NFR BLD AUTO: 15.5 % (ref 24–44)
MCH RBC QN AUTO: 32.3 PG (ref 27–33)
MCHC RBC AUTO-ENTMCNC: 32.2 G/DL (ref 32–36.5)
MCV RBC AUTO: 100.2 FL (ref 80–96)
MONOCYTES # BLD AUTO: 0.5 10^3/UL (ref 0–0.8)
MONOCYTES NFR BLD AUTO: 9.5 % (ref 2–8)
NEUTROPHILS # BLD AUTO: 4.1 10^3/UL (ref 1.5–8.5)
NEUTROPHILS NFR BLD AUTO: 74.2 % (ref 36–66)
PCO2 BLDA: 44.9 MMHG (ref 35–45)
PH BLDA: 7.38 UNITS (ref 7.35–7.45)
PLATELET # BLD AUTO: 131 10^3/UL (ref 150–450)
PO2 BLDA: 52.1 MMHG (ref 75–100)
POTASSIUM SERPL-SCNC: 5.7 MMOL/L (ref 3.5–5.1)
PROT SERPL-MCNC: 6.4 G/DL (ref 5.7–8.2)
RBC # BLD AUTO: 4.58 10^6/UL (ref 4–5.4)
SAO2 % BLDA: 88.7 % (ref 95–99)
SODIUM SERPL-SCNC: 137 MMOL/L (ref 136–145)
TSH SERPL DL<=0.005 MIU/L-ACNC: 0.89 UIU/ML (ref 0.55–4.78)
WBC # BLD AUTO: 5.5 10^3/UL (ref 4–10)

## 2022-12-15 RX ADMIN — IPRATROPIUM BROMIDE AND ALBUTEROL SULFATE PRN ML: .5; 3 SOLUTION RESPIRATORY (INHALATION) at 22:24

## 2022-12-16 VITALS — SYSTOLIC BLOOD PRESSURE: 123 MMHG | DIASTOLIC BLOOD PRESSURE: 66 MMHG

## 2022-12-16 VITALS — DIASTOLIC BLOOD PRESSURE: 76 MMHG | SYSTOLIC BLOOD PRESSURE: 117 MMHG

## 2022-12-16 LAB
BUN SERPL-MCNC: 12 MG/DL (ref 9–23)
CALCIUM SERPL-MCNC: 8.7 MG/DL (ref 8.3–10.6)
CHLORIDE SERPL-SCNC: 104 MMOL/L (ref 98–107)
CK MB CFR.DF SERPL CALC: 2.22
CK MB SERPL-MCNC: < 1 NG/ML (ref ?–3.6)
CK SERPL-CCNC: 45 U/L (ref 34–145)
CO2 SERPL-SCNC: 24 MMOL/L (ref 20–31)
CREAT SERPL-MCNC: 0.53 MG/DL (ref 0.55–1.3)
GFR SERPL CREATININE-BSD FRML MDRD: > 60 ML/MIN/{1.73_M2} (ref 45–?)
GLUCOSE SERPL-MCNC: 202 MG/DL (ref 74–106)
HCT VFR BLD AUTO: 43.6 % (ref 36–47)
HGB BLD-MCNC: 14.1 G/DL (ref 12–15.5)
MCH RBC QN AUTO: 32.2 PG (ref 27–33)
MCHC RBC AUTO-ENTMCNC: 32.3 G/DL (ref 32–36.5)
MCV RBC AUTO: 99.5 FL (ref 80–96)
PLATELET # BLD AUTO: 142 10^3/UL (ref 150–450)
POTASSIUM SERPL-SCNC: 4 MMOL/L (ref 3.5–5.1)
POTASSIUM SERPL-SCNC: 4.2 MMOL/L (ref 3.5–5.1)
RBC # BLD AUTO: 4.38 10^6/UL (ref 4–5.4)
SODIUM SERPL-SCNC: 137 MMOL/L (ref 136–145)
WBC # BLD AUTO: 3.6 10^3/UL (ref 4–10)

## 2022-12-16 RX ADMIN — BUDESONIDE AND FORMOTEROL FUMARATE DIHYDRATE SCH PUFF: 160; 4.5 AEROSOL RESPIRATORY (INHALATION) at 19:54

## 2022-12-16 RX ADMIN — GABAPENTIN SCH MG: 300 CAPSULE ORAL at 08:19

## 2022-12-16 RX ADMIN — IPRATROPIUM BROMIDE AND ALBUTEROL SULFATE SCH ML: .5; 3 SOLUTION RESPIRATORY (INHALATION) at 23:50

## 2022-12-16 RX ADMIN — AZITHROMYCIN MONOHYDRATE SCH MG: 250 TABLET ORAL at 03:00

## 2022-12-16 RX ADMIN — IPRATROPIUM BROMIDE AND ALBUTEROL SULFATE SCH ML: .5; 3 SOLUTION RESPIRATORY (INHALATION) at 12:00

## 2022-12-16 RX ADMIN — IPRATROPIUM BROMIDE AND ALBUTEROL SULFATE SCH ML: .5; 3 SOLUTION RESPIRATORY (INHALATION) at 08:00

## 2022-12-16 RX ADMIN — VALSARTAN SCH MG: 80 TABLET ORAL at 21:16

## 2022-12-16 RX ADMIN — IPRATROPIUM BROMIDE AND ALBUTEROL SULFATE SCH ML: .5; 3 SOLUTION RESPIRATORY (INHALATION) at 02:19

## 2022-12-16 RX ADMIN — ENOXAPARIN SODIUM SCH MG: 40 INJECTION SUBCUTANEOUS at 08:11

## 2022-12-16 RX ADMIN — MULTIPLE VITAMINS W/ MINERALS TAB SCH TAB: TAB at 08:11

## 2022-12-16 RX ADMIN — GABAPENTIN SCH MG: 300 CAPSULE ORAL at 21:16

## 2022-12-16 RX ADMIN — CLOPIDOGREL BISULFATE SCH MG: 75 TABLET ORAL at 08:10

## 2022-12-16 RX ADMIN — IPRATROPIUM BROMIDE AND ALBUTEROL SULFATE SCH ML: .5; 3 SOLUTION RESPIRATORY (INHALATION) at 19:55

## 2022-12-16 RX ADMIN — METHYLPREDNISOLONE SODIUM SUCCINATE SCH MG: 40 INJECTION, POWDER, FOR SOLUTION INTRAMUSCULAR; INTRAVENOUS at 21:17

## 2022-12-16 RX ADMIN — ATORVASTATIN CALCIUM SCH MG: 20 TABLET, FILM COATED ORAL at 21:00

## 2022-12-16 RX ADMIN — BUDESONIDE AND FORMOTEROL FUMARATE DIHYDRATE SCH PUFF: 160; 4.5 AEROSOL RESPIRATORY (INHALATION) at 08:39

## 2022-12-16 RX ADMIN — NICOTINE SCH PATCH: 21 PATCH, EXTENDED RELEASE TRANSDERMAL at 08:11

## 2022-12-16 RX ADMIN — GABAPENTIN SCH MG: 300 CAPSULE ORAL at 08:10

## 2022-12-16 RX ADMIN — IPRATROPIUM BROMIDE AND ALBUTEROL SULFATE PRN ML: .5; 3 SOLUTION RESPIRATORY (INHALATION) at 00:23

## 2022-12-16 RX ADMIN — IPRATROPIUM BROMIDE AND ALBUTEROL SULFATE SCH ML: .5; 3 SOLUTION RESPIRATORY (INHALATION) at 13:44

## 2022-12-17 VITALS — SYSTOLIC BLOOD PRESSURE: 108 MMHG | DIASTOLIC BLOOD PRESSURE: 71 MMHG

## 2022-12-17 VITALS — DIASTOLIC BLOOD PRESSURE: 75 MMHG | SYSTOLIC BLOOD PRESSURE: 125 MMHG

## 2022-12-17 VITALS — SYSTOLIC BLOOD PRESSURE: 125 MMHG | DIASTOLIC BLOOD PRESSURE: 75 MMHG

## 2022-12-17 LAB
BUN SERPL-MCNC: 15 MG/DL (ref 9–23)
CALCIUM SERPL-MCNC: 8.8 MG/DL (ref 8.3–10.6)
CHLORIDE SERPL-SCNC: 106 MMOL/L (ref 98–107)
CO2 SERPL-SCNC: 23 MMOL/L (ref 20–31)
CREAT SERPL-MCNC: 0.57 MG/DL (ref 0.55–1.3)
GFR SERPL CREATININE-BSD FRML MDRD: > 60 ML/MIN/{1.73_M2} (ref 45–?)
GLUCOSE SERPL-MCNC: 126 MG/DL (ref 74–106)
HCT VFR BLD AUTO: 45.5 % (ref 36–47)
HGB BLD-MCNC: 14.6 G/DL (ref 12–15.5)
MAGNESIUM SERPL-MCNC: 2.1 MG/DL (ref 1.8–2.4)
MCH RBC QN AUTO: 31.9 PG (ref 27–33)
MCHC RBC AUTO-ENTMCNC: 32.1 G/DL (ref 32–36.5)
MCV RBC AUTO: 99.3 FL (ref 80–96)
PHOSPHATE SERPL-MCNC: 4.5 MG/DL (ref 2.4–5.1)
PLATELET # BLD AUTO: 141 10^3/UL (ref 150–450)
POTASSIUM SERPL-SCNC: 4.4 MMOL/L (ref 3.5–5.1)
RBC # BLD AUTO: 4.58 10^6/UL (ref 4–5.4)
SODIUM SERPL-SCNC: 139 MMOL/L (ref 136–145)
WBC # BLD AUTO: 4.9 10^3/UL (ref 4–10)

## 2022-12-17 RX ADMIN — DEXTROSE MONOHYDRATE SCH MG: 50 INJECTION, SOLUTION INTRAVENOUS at 15:59

## 2022-12-17 RX ADMIN — BUDESONIDE AND FORMOTEROL FUMARATE DIHYDRATE SCH PUFF: 160; 4.5 AEROSOL RESPIRATORY (INHALATION) at 20:43

## 2022-12-17 RX ADMIN — ENOXAPARIN SODIUM SCH MG: 40 INJECTION SUBCUTANEOUS at 09:00

## 2022-12-17 RX ADMIN — VALSARTAN SCH MG: 80 TABLET ORAL at 20:55

## 2022-12-17 RX ADMIN — METHYLPREDNISOLONE SODIUM SUCCINATE SCH MG: 40 INJECTION, POWDER, FOR SOLUTION INTRAMUSCULAR; INTRAVENOUS at 15:03

## 2022-12-17 RX ADMIN — IPRATROPIUM BROMIDE AND ALBUTEROL SULFATE SCH ML: .5; 3 SOLUTION RESPIRATORY (INHALATION) at 15:13

## 2022-12-17 RX ADMIN — BUDESONIDE AND FORMOTEROL FUMARATE DIHYDRATE SCH PUFF: 160; 4.5 AEROSOL RESPIRATORY (INHALATION) at 08:02

## 2022-12-17 RX ADMIN — IPRATROPIUM BROMIDE AND ALBUTEROL SULFATE SCH ML: .5; 3 SOLUTION RESPIRATORY (INHALATION) at 02:50

## 2022-12-17 RX ADMIN — AZITHROMYCIN MONOHYDRATE SCH MG: 250 TABLET ORAL at 09:23

## 2022-12-17 RX ADMIN — ATORVASTATIN CALCIUM SCH MG: 20 TABLET, FILM COATED ORAL at 20:55

## 2022-12-17 RX ADMIN — GABAPENTIN SCH MG: 300 CAPSULE ORAL at 20:53

## 2022-12-17 RX ADMIN — IPRATROPIUM BROMIDE AND ALBUTEROL SULFATE SCH ML: .5; 3 SOLUTION RESPIRATORY (INHALATION) at 08:00

## 2022-12-17 RX ADMIN — IPRATROPIUM BROMIDE AND ALBUTEROL SULFATE SCH ML: .5; 3 SOLUTION RESPIRATORY (INHALATION) at 11:24

## 2022-12-17 RX ADMIN — METHYLPREDNISOLONE SODIUM SUCCINATE SCH MG: 40 INJECTION, POWDER, FOR SOLUTION INTRAMUSCULAR; INTRAVENOUS at 20:55

## 2022-12-17 RX ADMIN — CLOPIDOGREL BISULFATE SCH MG: 75 TABLET ORAL at 09:23

## 2022-12-17 RX ADMIN — IPRATROPIUM BROMIDE AND ALBUTEROL SULFATE SCH ML: .5; 3 SOLUTION RESPIRATORY (INHALATION) at 20:44

## 2022-12-17 RX ADMIN — METHYLPREDNISOLONE SODIUM SUCCINATE SCH MG: 40 INJECTION, POWDER, FOR SOLUTION INTRAMUSCULAR; INTRAVENOUS at 05:13

## 2022-12-17 RX ADMIN — MULTIPLE VITAMINS W/ MINERALS TAB SCH TAB: TAB at 09:21

## 2022-12-17 RX ADMIN — MONTELUKAST SODIUM SCH MG: 10 TABLET, FILM COATED ORAL at 20:53

## 2022-12-17 RX ADMIN — GABAPENTIN SCH MG: 300 CAPSULE ORAL at 09:23

## 2022-12-17 RX ADMIN — NICOTINE SCH PATCH: 21 PATCH, EXTENDED RELEASE TRANSDERMAL at 09:21

## 2022-12-18 VITALS — DIASTOLIC BLOOD PRESSURE: 60 MMHG | SYSTOLIC BLOOD PRESSURE: 124 MMHG

## 2022-12-18 VITALS — SYSTOLIC BLOOD PRESSURE: 156 MMHG | DIASTOLIC BLOOD PRESSURE: 81 MMHG

## 2022-12-18 VITALS — DIASTOLIC BLOOD PRESSURE: 59 MMHG | SYSTOLIC BLOOD PRESSURE: 123 MMHG

## 2022-12-18 VITALS — OXYGEN SATURATION: 94 %

## 2022-12-18 LAB
BUN SERPL-MCNC: 16 MG/DL (ref 9–23)
CALCIUM SERPL-MCNC: 9 MG/DL (ref 8.3–10.6)
CHLORIDE SERPL-SCNC: 104 MMOL/L (ref 98–107)
CO2 SERPL-SCNC: 27 MMOL/L (ref 20–31)
CREAT SERPL-MCNC: 0.64 MG/DL (ref 0.55–1.3)
GFR SERPL CREATININE-BSD FRML MDRD: > 60 ML/MIN/{1.73_M2} (ref 45–?)
GLUCOSE SERPL-MCNC: 136 MG/DL (ref 74–106)
HCT VFR BLD AUTO: 43.8 % (ref 36–47)
HGB BLD-MCNC: 13.9 G/DL (ref 12–15.5)
MAGNESIUM SERPL-MCNC: 2.1 MG/DL (ref 1.8–2.4)
MCH RBC QN AUTO: 31.8 PG (ref 27–33)
MCHC RBC AUTO-ENTMCNC: 31.7 G/DL (ref 32–36.5)
MCV RBC AUTO: 100.2 FL (ref 80–96)
PHOSPHATE SERPL-MCNC: 4.1 MG/DL (ref 2.4–5.1)
PLATELET # BLD AUTO: 151 10^3/UL (ref 150–450)
POTASSIUM SERPL-SCNC: 4.6 MMOL/L (ref 3.5–5.1)
RBC # BLD AUTO: 4.37 10^6/UL (ref 4–5.4)
SODIUM SERPL-SCNC: 139 MMOL/L (ref 136–145)
WBC # BLD AUTO: 6.3 10^3/UL (ref 4–10)

## 2022-12-18 RX ADMIN — ATORVASTATIN CALCIUM SCH MG: 20 TABLET, FILM COATED ORAL at 20:51

## 2022-12-18 RX ADMIN — CLOPIDOGREL BISULFATE SCH MG: 75 TABLET ORAL at 10:15

## 2022-12-18 RX ADMIN — IPRATROPIUM BROMIDE AND ALBUTEROL SULFATE SCH ML: .5; 3 SOLUTION RESPIRATORY (INHALATION) at 04:11

## 2022-12-18 RX ADMIN — GABAPENTIN SCH MG: 300 CAPSULE ORAL at 20:50

## 2022-12-18 RX ADMIN — IPRATROPIUM BROMIDE AND ALBUTEROL SULFATE SCH ML: .5; 3 SOLUTION RESPIRATORY (INHALATION) at 00:53

## 2022-12-18 RX ADMIN — METHYLPREDNISOLONE SODIUM SUCCINATE SCH MG: 40 INJECTION, POWDER, FOR SOLUTION INTRAMUSCULAR; INTRAVENOUS at 14:41

## 2022-12-18 RX ADMIN — ENOXAPARIN SODIUM SCH MG: 40 INJECTION SUBCUTANEOUS at 09:00

## 2022-12-18 RX ADMIN — IPRATROPIUM BROMIDE AND ALBUTEROL SULFATE SCH ML: .5; 3 SOLUTION RESPIRATORY (INHALATION) at 21:45

## 2022-12-18 RX ADMIN — VALSARTAN SCH MG: 80 TABLET ORAL at 20:51

## 2022-12-18 RX ADMIN — METHYLPREDNISOLONE SODIUM SUCCINATE SCH MG: 40 INJECTION, POWDER, FOR SOLUTION INTRAMUSCULAR; INTRAVENOUS at 20:51

## 2022-12-18 RX ADMIN — NICOTINE SCH PATCH: 21 PATCH, EXTENDED RELEASE TRANSDERMAL at 10:15

## 2022-12-18 RX ADMIN — METHYLPREDNISOLONE SODIUM SUCCINATE SCH MG: 40 INJECTION, POWDER, FOR SOLUTION INTRAMUSCULAR; INTRAVENOUS at 05:40

## 2022-12-18 RX ADMIN — MONTELUKAST SODIUM SCH MG: 10 TABLET, FILM COATED ORAL at 20:50

## 2022-12-18 RX ADMIN — IPRATROPIUM BROMIDE AND ALBUTEROL SULFATE SCH ML: .5; 3 SOLUTION RESPIRATORY (INHALATION) at 15:21

## 2022-12-18 RX ADMIN — IPRATROPIUM BROMIDE AND ALBUTEROL SULFATE SCH ML: .5; 3 SOLUTION RESPIRATORY (INHALATION) at 11:12

## 2022-12-18 RX ADMIN — BUDESONIDE AND FORMOTEROL FUMARATE DIHYDRATE SCH PUFF: 160; 4.5 AEROSOL RESPIRATORY (INHALATION) at 07:13

## 2022-12-18 RX ADMIN — IPRATROPIUM BROMIDE AND ALBUTEROL SULFATE SCH ML: .5; 3 SOLUTION RESPIRATORY (INHALATION) at 07:13

## 2022-12-18 RX ADMIN — MULTIPLE VITAMINS W/ MINERALS TAB SCH TAB: TAB at 10:15

## 2022-12-18 RX ADMIN — DEXTROSE MONOHYDRATE SCH MG: 50 INJECTION, SOLUTION INTRAVENOUS at 10:18

## 2022-12-18 RX ADMIN — GABAPENTIN SCH MG: 300 CAPSULE ORAL at 10:15

## 2022-12-18 RX ADMIN — BUDESONIDE AND FORMOTEROL FUMARATE DIHYDRATE SCH PUFF: 160; 4.5 AEROSOL RESPIRATORY (INHALATION) at 21:45

## 2022-12-19 VITALS — OXYGEN SATURATION: 90 %

## 2022-12-19 VITALS — DIASTOLIC BLOOD PRESSURE: 79 MMHG | SYSTOLIC BLOOD PRESSURE: 150 MMHG

## 2022-12-19 VITALS — DIASTOLIC BLOOD PRESSURE: 80 MMHG | SYSTOLIC BLOOD PRESSURE: 133 MMHG

## 2022-12-19 VITALS — OXYGEN SATURATION: 95 %

## 2022-12-19 VITALS — SYSTOLIC BLOOD PRESSURE: 150 MMHG | DIASTOLIC BLOOD PRESSURE: 79 MMHG

## 2022-12-19 LAB
BUN SERPL-MCNC: 18 MG/DL (ref 9–23)
CALCIUM SERPL-MCNC: 9 MG/DL (ref 8.3–10.6)
CHLORIDE SERPL-SCNC: 104 MMOL/L (ref 98–107)
CO2 SERPL-SCNC: 30 MMOL/L (ref 20–31)
CREAT SERPL-MCNC: 0.55 MG/DL (ref 0.55–1.3)
GFR SERPL CREATININE-BSD FRML MDRD: > 60 ML/MIN/{1.73_M2} (ref 45–?)
GLUCOSE SERPL-MCNC: 125 MG/DL (ref 74–106)
HCT VFR BLD AUTO: 44.9 % (ref 36–47)
HGB BLD-MCNC: 14.7 G/DL (ref 12–15.5)
MAGNESIUM SERPL-MCNC: 2 MG/DL (ref 1.8–2.4)
MCH RBC QN AUTO: 32.5 PG (ref 27–33)
MCHC RBC AUTO-ENTMCNC: 32.7 G/DL (ref 32–36.5)
MCV RBC AUTO: 99.3 FL (ref 80–96)
PHOSPHATE SERPL-MCNC: 4.2 MG/DL (ref 2.4–5.1)
PLATELET # BLD AUTO: 166 10^3/UL (ref 150–450)
POTASSIUM SERPL-SCNC: 4.1 MMOL/L (ref 3.5–5.1)
RBC # BLD AUTO: 4.52 10^6/UL (ref 4–5.4)
SODIUM SERPL-SCNC: 139 MMOL/L (ref 136–145)
WBC # BLD AUTO: 6.6 10^3/UL (ref 4–10)

## 2022-12-19 RX ADMIN — MONTELUKAST SODIUM SCH MG: 10 TABLET, FILM COATED ORAL at 21:49

## 2022-12-19 RX ADMIN — METHYLPREDNISOLONE SODIUM SUCCINATE SCH MG: 40 INJECTION, POWDER, FOR SOLUTION INTRAMUSCULAR; INTRAVENOUS at 09:15

## 2022-12-19 RX ADMIN — GABAPENTIN SCH MG: 300 CAPSULE ORAL at 21:48

## 2022-12-19 RX ADMIN — METHYLPREDNISOLONE SODIUM SUCCINATE SCH MG: 40 INJECTION, POWDER, FOR SOLUTION INTRAMUSCULAR; INTRAVENOUS at 05:11

## 2022-12-19 RX ADMIN — IPRATROPIUM BROMIDE AND ALBUTEROL SULFATE SCH ML: .5; 3 SOLUTION RESPIRATORY (INHALATION) at 19:48

## 2022-12-19 RX ADMIN — IPRATROPIUM BROMIDE AND ALBUTEROL SULFATE SCH ML: .5; 3 SOLUTION RESPIRATORY (INHALATION) at 01:40

## 2022-12-19 RX ADMIN — GABAPENTIN SCH MG: 300 CAPSULE ORAL at 09:03

## 2022-12-19 RX ADMIN — NICOTINE SCH PATCH: 21 PATCH, EXTENDED RELEASE TRANSDERMAL at 09:16

## 2022-12-19 RX ADMIN — ENOXAPARIN SODIUM SCH MG: 40 INJECTION SUBCUTANEOUS at 08:59

## 2022-12-19 RX ADMIN — VALSARTAN SCH MG: 80 TABLET ORAL at 21:50

## 2022-12-19 RX ADMIN — MULTIPLE VITAMINS W/ MINERALS TAB SCH TAB: TAB at 09:15

## 2022-12-19 RX ADMIN — IPRATROPIUM BROMIDE AND ALBUTEROL SULFATE SCH ML: .5; 3 SOLUTION RESPIRATORY (INHALATION) at 11:38

## 2022-12-19 RX ADMIN — CLOPIDOGREL BISULFATE SCH MG: 75 TABLET ORAL at 09:14

## 2022-12-19 RX ADMIN — IPRATROPIUM BROMIDE AND ALBUTEROL SULFATE SCH ML: .5; 3 SOLUTION RESPIRATORY (INHALATION) at 16:02

## 2022-12-19 RX ADMIN — IPRATROPIUM BROMIDE AND ALBUTEROL SULFATE SCH ML: .5; 3 SOLUTION RESPIRATORY (INHALATION) at 23:50

## 2022-12-19 RX ADMIN — ATORVASTATIN CALCIUM SCH MG: 20 TABLET, FILM COATED ORAL at 21:00

## 2022-12-19 RX ADMIN — DEXTROSE MONOHYDRATE SCH MG: 50 INJECTION, SOLUTION INTRAVENOUS at 09:15

## 2022-12-19 RX ADMIN — BUDESONIDE AND FORMOTEROL FUMARATE DIHYDRATE SCH PUFF: 160; 4.5 AEROSOL RESPIRATORY (INHALATION) at 08:43

## 2022-12-19 RX ADMIN — IPRATROPIUM BROMIDE AND ALBUTEROL SULFATE SCH ML: .5; 3 SOLUTION RESPIRATORY (INHALATION) at 05:00

## 2022-12-19 RX ADMIN — BUDESONIDE AND FORMOTEROL FUMARATE DIHYDRATE SCH PUFF: 160; 4.5 AEROSOL RESPIRATORY (INHALATION) at 19:48

## 2022-12-19 RX ADMIN — IPRATROPIUM BROMIDE AND ALBUTEROL SULFATE SCH ML: .5; 3 SOLUTION RESPIRATORY (INHALATION) at 08:00

## 2022-12-20 VITALS — SYSTOLIC BLOOD PRESSURE: 116 MMHG | DIASTOLIC BLOOD PRESSURE: 71 MMHG

## 2022-12-20 VITALS — SYSTOLIC BLOOD PRESSURE: 133 MMHG | DIASTOLIC BLOOD PRESSURE: 71 MMHG

## 2022-12-20 VITALS — OXYGEN SATURATION: 92 %

## 2022-12-20 VITALS — OXYGEN SATURATION: 94 %

## 2022-12-20 RX ADMIN — NICOTINE SCH PATCH: 21 PATCH, EXTENDED RELEASE TRANSDERMAL at 10:11

## 2022-12-20 RX ADMIN — MULTIPLE VITAMINS W/ MINERALS TAB SCH TAB: TAB at 10:10

## 2022-12-20 RX ADMIN — DEXTROSE MONOHYDRATE SCH MG: 50 INJECTION, SOLUTION INTRAVENOUS at 10:15

## 2022-12-20 RX ADMIN — ENOXAPARIN SODIUM SCH MG: 40 INJECTION SUBCUTANEOUS at 09:00

## 2022-12-20 RX ADMIN — BUDESONIDE AND FORMOTEROL FUMARATE DIHYDRATE SCH PUFF: 160; 4.5 AEROSOL RESPIRATORY (INHALATION) at 08:14

## 2022-12-20 RX ADMIN — IPRATROPIUM BROMIDE AND ALBUTEROL SULFATE SCH ML: .5; 3 SOLUTION RESPIRATORY (INHALATION) at 11:21

## 2022-12-20 RX ADMIN — CLOPIDOGREL BISULFATE SCH MG: 75 TABLET ORAL at 11:18

## 2022-12-20 RX ADMIN — GABAPENTIN SCH MG: 300 CAPSULE ORAL at 10:10

## 2022-12-20 RX ADMIN — METHYLPREDNISOLONE SODIUM SUCCINATE SCH MG: 40 INJECTION, POWDER, FOR SOLUTION INTRAMUSCULAR; INTRAVENOUS at 10:16

## 2022-12-20 RX ADMIN — IPRATROPIUM BROMIDE AND ALBUTEROL SULFATE SCH ML: .5; 3 SOLUTION RESPIRATORY (INHALATION) at 08:14

## 2022-12-20 RX ADMIN — IPRATROPIUM BROMIDE AND ALBUTEROL SULFATE SCH ML: .5; 3 SOLUTION RESPIRATORY (INHALATION) at 03:34

## 2023-04-11 ENCOUNTER — HOSPITAL ENCOUNTER (OUTPATIENT)
Dept: HOSPITAL 53 - M PLAIMG | Age: 63
End: 2023-04-11
Attending: STUDENT IN AN ORGANIZED HEALTH CARE EDUCATION/TRAINING PROGRAM
Payer: COMMERCIAL

## 2023-04-11 DIAGNOSIS — R91.8: Primary | ICD-10-CM

## 2023-09-01 ENCOUNTER — HOSPITAL ENCOUNTER (OUTPATIENT)
Dept: HOSPITAL 53 - M RAD | Age: 63
End: 2023-09-01
Attending: PHYSICIAN ASSISTANT
Payer: COMMERCIAL

## 2023-09-01 DIAGNOSIS — Z98.890: Primary | ICD-10-CM

## 2023-09-01 LAB
ALBUMIN SERPL BCG-MCNC: 3.7 G/DL (ref 3.2–5.2)
ALP SERPL-CCNC: 94 U/L (ref 46–116)
ALT SERPL W P-5'-P-CCNC: 20 U/L (ref 7–40)
AST SERPL-CCNC: 15 U/L (ref ?–34)
BASOPHILS # BLD AUTO: 0 10^3/UL (ref 0–0.2)
BASOPHILS NFR BLD AUTO: 0.9 % (ref 0–1)
BILIRUB SERPL-MCNC: 0.7 MG/DL (ref 0.3–1.2)
BUN SERPL-MCNC: 9 MG/DL (ref 9–23)
CALCIUM SERPL-MCNC: 9 MG/DL (ref 8.3–10.6)
CHLORIDE SERPL-SCNC: 107 MMOL/L (ref 98–107)
CHOLEST SERPL-MCNC: 200 MG/DL (ref ?–200)
CHOLEST/HDLC SERPL: 3.35 {RATIO} (ref ?–5)
CO2 SERPL-SCNC: 28 MMOL/L (ref 20–31)
CREAT SERPL-MCNC: 0.72 MG/DL (ref 0.55–1.3)
EOSINOPHIL # BLD AUTO: 0.2 10^3/UL (ref 0–0.5)
EOSINOPHIL NFR BLD AUTO: 3.6 % (ref 0–3)
GFR SERPL CREATININE-BSD FRML MDRD: > 60 ML/MIN/{1.73_M2} (ref 45–?)
GLUCOSE SERPL-MCNC: 95 MG/DL (ref 74–106)
HCT VFR BLD AUTO: 41 % (ref 36–47)
HDLC SERPL-MCNC: 59.6 MG/DL (ref 40–?)
HGB BLD-MCNC: 13.3 G/DL (ref 12–15.5)
LDLC SERPL CALC-MCNC: 126.2 MG/DL (ref ?–100)
LYMPHOCYTES # BLD AUTO: 1.3 10^3/UL (ref 1.5–5)
LYMPHOCYTES NFR BLD AUTO: 28 % (ref 24–44)
MCH RBC QN AUTO: 31.4 PG (ref 27–33)
MCHC RBC AUTO-ENTMCNC: 32.4 G/DL (ref 32–36.5)
MCV RBC AUTO: 96.7 FL (ref 80–96)
MONOCYTES # BLD AUTO: 0.3 10^3/UL (ref 0–0.8)
MONOCYTES NFR BLD AUTO: 6.9 % (ref 2–8)
NEUTROPHILS # BLD AUTO: 2.7 10^3/UL (ref 1.5–8.5)
NEUTROPHILS NFR BLD AUTO: 60.4 % (ref 36–66)
NONHDLC SERPL-MCNC: 140.4 MG/DL
PLATELET # BLD AUTO: 157 10^3/UL (ref 150–450)
POTASSIUM SERPL-SCNC: 4.5 MMOL/L (ref 3.5–5.1)
PROT SERPL-MCNC: 6.4 G/DL (ref 5.7–8.2)
RBC # BLD AUTO: 4.24 10^6/UL (ref 4–5.4)
SODIUM SERPL-SCNC: 141 MMOL/L (ref 136–145)
TRIGL SERPL-MCNC: 71 MG/DL (ref ?–150)
WBC # BLD AUTO: 4.5 10^3/UL (ref 4–10)

## 2023-09-01 PROCEDURE — 80053 COMPREHEN METABOLIC PANEL: CPT

## 2023-09-01 PROCEDURE — 85025 COMPLETE CBC W/AUTO DIFF WBC: CPT

## 2023-09-01 PROCEDURE — 36415 COLL VENOUS BLD VENIPUNCTURE: CPT

## 2023-09-01 PROCEDURE — 80061 LIPID PANEL: CPT

## 2023-09-01 PROCEDURE — 74177 CT ABD & PELVIS W/CONTRAST: CPT

## 2023-09-26 ENCOUNTER — HOSPITAL ENCOUNTER (OUTPATIENT)
Dept: HOSPITAL 53 - M SFHCADAM | Age: 63
End: 2023-09-26
Attending: PHYSICIAN ASSISTANT
Payer: COMMERCIAL

## 2023-09-26 DIAGNOSIS — R63.5: Primary | ICD-10-CM

## 2023-09-26 DIAGNOSIS — Z53.9: ICD-10-CM

## 2023-09-26 DIAGNOSIS — R53.82: ICD-10-CM

## 2023-12-13 NOTE — REP
Right lower extremity Duplex Doppler venous ultrasound:

 

Real time compression and duplex Doppler interrogation of the right lower

extremity deep venous system is performed.  The right common femoral, superficial

femoral and popliteal veins are fully compressible with transducer pressure and

demonstrate normal spontaneous and phasic flow, without evidence of deep venous

thrombosis.

 

Impression:

 

No evidence of deep venous thrombosis of the right lower extremity femoral

popliteal venous system.

 

 

Electronically Signed by

Wyatt Mehta MD 05/26/2020 12:54 P
no

## 2024-03-12 ENCOUNTER — HOSPITAL ENCOUNTER (OUTPATIENT)
Dept: HOSPITAL 53 - M SFHCADAM | Age: 64
End: 2024-03-12
Attending: PHYSICIAN ASSISTANT
Payer: COMMERCIAL

## 2024-03-12 DIAGNOSIS — I73.9: ICD-10-CM

## 2024-03-12 DIAGNOSIS — Z98.890: ICD-10-CM

## 2024-03-12 DIAGNOSIS — I10: ICD-10-CM

## 2024-03-12 DIAGNOSIS — K21.9: ICD-10-CM

## 2024-03-12 DIAGNOSIS — F17.218: ICD-10-CM

## 2024-03-12 DIAGNOSIS — I25.10: Primary | ICD-10-CM

## 2024-03-12 LAB
ALBUMIN SERPL BCG-MCNC: 3.9 G/DL (ref 3.2–5.2)
ALP SERPL-CCNC: 108 U/L (ref 46–116)
ALT SERPL W P-5'-P-CCNC: 15 U/L (ref 7–40)
AST SERPL-CCNC: 15 U/L (ref ?–34)
BILIRUB SERPL-MCNC: 0.6 MG/DL (ref 0.3–1.2)
BUN SERPL-MCNC: 23 MG/DL (ref 9–23)
CALCIUM SERPL-MCNC: 8.8 MG/DL (ref 8.3–10.6)
CHLORIDE SERPL-SCNC: 110 MMOL/L (ref 98–107)
CHOLEST SERPL-MCNC: 152 MG/DL (ref ?–200)
CHOLEST/HDLC SERPL: 3.03 {RATIO} (ref ?–5)
CO2 SERPL-SCNC: 27 MMOL/L (ref 20–31)
CREAT SERPL-MCNC: 0.7 MG/DL (ref 0.55–1.3)
GFR SERPL CREATININE-BSD FRML MDRD: > 60 ML/MIN/{1.73_M2} (ref 45–?)
GLUCOSE SERPL-MCNC: 91 MG/DL (ref 74–106)
HCT VFR BLD AUTO: 42.1 % (ref 36–47)
HDLC SERPL-MCNC: 50.1 MG/DL (ref 40–?)
HGB BLD-MCNC: 13.6 G/DL (ref 12–15.5)
LDLC SERPL CALC-MCNC: 82.5 MG/DL (ref ?–100)
MCH RBC QN AUTO: 31.7 PG (ref 27–33)
MCHC RBC AUTO-ENTMCNC: 32.3 G/DL (ref 32–36.5)
MCV RBC AUTO: 98.1 FL (ref 80–96)
NONHDLC SERPL-MCNC: 101.9 MG/DL
PLATELET # BLD AUTO: 162 10^3/UL (ref 150–450)
POTASSIUM SERPL-SCNC: 4.3 MMOL/L (ref 3.5–5.1)
PROT SERPL-MCNC: 6.5 G/DL (ref 5.7–8.2)
RBC # BLD AUTO: 4.29 10^6/UL (ref 4–5.4)
SODIUM SERPL-SCNC: 141 MMOL/L (ref 136–145)
TRIGL SERPL-MCNC: 97 MG/DL (ref ?–150)
WBC # BLD AUTO: 5.6 10^3/UL (ref 4–10)

## 2024-03-15 ENCOUNTER — HOSPITAL ENCOUNTER (OUTPATIENT)
Dept: HOSPITAL 53 - M ADAMS | Age: 64
End: 2024-03-15
Attending: PHYSICIAN ASSISTANT
Payer: COMMERCIAL

## 2024-03-15 DIAGNOSIS — M25.551: Primary | ICD-10-CM

## 2024-04-17 ENCOUNTER — HOSPITAL ENCOUNTER (OUTPATIENT)
Dept: HOSPITAL 53 - M WUC | Age: 64
End: 2024-04-17
Attending: PHYSICIAN ASSISTANT
Payer: COMMERCIAL

## 2024-04-17 DIAGNOSIS — J44.1: Primary | ICD-10-CM

## 2024-06-27 ENCOUNTER — HOSPITAL ENCOUNTER (OUTPATIENT)
Dept: HOSPITAL 53 - M RAD | Age: 64
End: 2024-06-27
Attending: STUDENT IN AN ORGANIZED HEALTH CARE EDUCATION/TRAINING PROGRAM
Payer: COMMERCIAL

## 2024-06-27 DIAGNOSIS — F17.218: ICD-10-CM

## 2024-06-27 DIAGNOSIS — Z12.2: Primary | ICD-10-CM

## 2024-11-11 ENCOUNTER — HOSPITAL ENCOUNTER (OUTPATIENT)
Dept: HOSPITAL 53 - M PLAIMG | Age: 64
End: 2024-11-11
Attending: PHYSICIAN ASSISTANT
Payer: COMMERCIAL

## 2024-11-11 DIAGNOSIS — R06.02: Primary | ICD-10-CM

## 2024-11-11 DIAGNOSIS — I34.0: ICD-10-CM

## 2025-04-29 ENCOUNTER — HOSPITAL ENCOUNTER (OUTPATIENT)
Dept: HOSPITAL 53 - M LAB | Age: 65
End: 2025-04-29
Attending: PHYSICIAN ASSISTANT
Payer: COMMERCIAL

## 2025-04-29 DIAGNOSIS — I10: ICD-10-CM

## 2025-04-29 DIAGNOSIS — E78.00: ICD-10-CM

## 2025-04-29 DIAGNOSIS — R06.02: Primary | ICD-10-CM

## 2025-04-29 LAB
ALBUMIN SERPL BCG-MCNC: 3.7 G/DL (ref 3.2–5.2)
BILIRUB SERPL-MCNC: 0.7 MG/DL (ref 0.3–1.2)
CALCIUM SERPL-MCNC: 8.8 MG/DL (ref 8.3–10.6)
CHOLEST/HDLC SERPL: 3.4 {RATIO} (ref ?–5)
CREAT SERPL-MCNC: 0.97 MG/DL (ref 0.55–1.3)
GFR SERPL CREATININE-BSD FRML MDRD: 66.1 ML/MIN/{1.73_M2} (ref 45–?)
HCT VFR BLD AUTO: 40.4 % (ref 36–47)
HDLC SERPL-MCNC: 58.7 MG/DL (ref 40–?)
HGB BLD-MCNC: 12.9 G/DL (ref 12–15.5)
LDLC SERPL CALC-MCNC: 117.3 MG/DL (ref ?–100)
MCH RBC QN AUTO: 31.5 PG (ref 27–33)
MCHC RBC AUTO-ENTMCNC: 31.9 G/DL (ref 32–36.5)
MCV RBC AUTO: 98.8 FL (ref 80–96)
NONHDLC SERPL-MCNC: 141.3 MG/DL
PLATELET # BLD AUTO: 173 10^3/UL (ref 150–450)
POTASSIUM SERPL-SCNC: 4.6 MMOL/L (ref 3.5–5.1)
PROT SERPL-MCNC: 6.6 G/DL (ref 5.7–8.2)
RBC # BLD AUTO: 4.09 10^6/UL (ref 4–5.4)
WBC # BLD AUTO: 5.9 10^3/UL (ref 4–10)